# Patient Record
Sex: FEMALE | Race: WHITE | NOT HISPANIC OR LATINO
[De-identification: names, ages, dates, MRNs, and addresses within clinical notes are randomized per-mention and may not be internally consistent; named-entity substitution may affect disease eponyms.]

---

## 2017-04-25 ENCOUNTER — APPOINTMENT (OUTPATIENT)
Dept: CARDIOLOGY | Facility: CLINIC | Age: 77
End: 2017-04-25

## 2017-04-25 ENCOUNTER — NON-APPOINTMENT (OUTPATIENT)
Age: 77
End: 2017-04-25

## 2017-04-25 VITALS
WEIGHT: 158 LBS | BODY MASS INDEX: 28 KG/M2 | DIASTOLIC BLOOD PRESSURE: 83 MMHG | OXYGEN SATURATION: 98 % | HEART RATE: 70 BPM | HEIGHT: 63 IN | SYSTOLIC BLOOD PRESSURE: 166 MMHG

## 2017-04-25 VITALS — SYSTOLIC BLOOD PRESSURE: 160 MMHG | DIASTOLIC BLOOD PRESSURE: 90 MMHG

## 2017-04-25 DIAGNOSIS — Z87.891 PERSONAL HISTORY OF NICOTINE DEPENDENCE: ICD-10-CM

## 2017-04-25 DIAGNOSIS — R09.89 OTHER SPECIFIED SYMPTOMS AND SIGNS INVOLVING THE CIRCULATORY AND RESPIRATORY SYSTEMS: ICD-10-CM

## 2017-04-25 DIAGNOSIS — Z82.3 FAMILY HISTORY OF STROKE: ICD-10-CM

## 2017-04-25 DIAGNOSIS — R94.31 ABNORMAL ELECTROCARDIOGRAM [ECG] [EKG]: ICD-10-CM

## 2017-04-25 RX ORDER — MULTIVITAMIN
CAPSULE ORAL
Refills: 0 | Status: ACTIVE | COMMUNITY

## 2017-04-25 RX ORDER — CALCIUM CITRATE 200 MG (950 MG) TABLET 200(950)MG
TABLET ORAL DAILY
Refills: 0 | Status: ACTIVE | COMMUNITY

## 2017-05-09 ENCOUNTER — APPOINTMENT (OUTPATIENT)
Dept: CARDIOLOGY | Facility: CLINIC | Age: 77
End: 2017-05-09

## 2017-05-09 VITALS — DIASTOLIC BLOOD PRESSURE: 92 MMHG | SYSTOLIC BLOOD PRESSURE: 180 MMHG

## 2017-05-09 VITALS — OXYGEN SATURATION: 97 % | SYSTOLIC BLOOD PRESSURE: 180 MMHG | HEART RATE: 67 BPM | DIASTOLIC BLOOD PRESSURE: 90 MMHG

## 2017-05-23 ENCOUNTER — APPOINTMENT (OUTPATIENT)
Dept: CARDIOLOGY | Facility: CLINIC | Age: 77
End: 2017-05-23

## 2017-05-23 VITALS
HEART RATE: 61 BPM | DIASTOLIC BLOOD PRESSURE: 78 MMHG | WEIGHT: 158 LBS | HEIGHT: 63 IN | BODY MASS INDEX: 28 KG/M2 | OXYGEN SATURATION: 100 % | SYSTOLIC BLOOD PRESSURE: 160 MMHG

## 2017-05-23 VITALS — SYSTOLIC BLOOD PRESSURE: 160 MMHG | DIASTOLIC BLOOD PRESSURE: 80 MMHG

## 2017-06-01 PROBLEM — R09.89 OTHER SPECIFIED SYMPTOMS AND SIGNS INVOLVING THE CIRCULATORY AND RESPIRATORY SYSTEMS: Status: ACTIVE | Noted: 2017-06-01

## 2017-06-06 ENCOUNTER — APPOINTMENT (OUTPATIENT)
Dept: CARDIOLOGY | Facility: CLINIC | Age: 77
End: 2017-06-06

## 2017-06-06 VITALS
SYSTOLIC BLOOD PRESSURE: 166 MMHG | BODY MASS INDEX: 28 KG/M2 | HEIGHT: 63 IN | HEART RATE: 69 BPM | DIASTOLIC BLOOD PRESSURE: 76 MMHG | OXYGEN SATURATION: 96 % | WEIGHT: 158 LBS

## 2017-06-06 VITALS — DIASTOLIC BLOOD PRESSURE: 78 MMHG | RESPIRATION RATE: 20 BRPM | HEART RATE: 68 BPM | SYSTOLIC BLOOD PRESSURE: 140 MMHG

## 2017-06-08 ENCOUNTER — APPOINTMENT (OUTPATIENT)
Dept: CARDIOLOGY | Facility: CLINIC | Age: 77
End: 2017-06-08

## 2017-07-18 ENCOUNTER — MEDICATION RENEWAL (OUTPATIENT)
Age: 77
End: 2017-07-18

## 2017-08-02 ENCOUNTER — MEDICATION RENEWAL (OUTPATIENT)
Age: 77
End: 2017-08-02

## 2017-08-10 ENCOUNTER — APPOINTMENT (OUTPATIENT)
Dept: CARDIOLOGY | Facility: CLINIC | Age: 77
End: 2017-08-10
Payer: MEDICARE

## 2017-08-10 VITALS
DIASTOLIC BLOOD PRESSURE: 82 MMHG | OXYGEN SATURATION: 98 % | HEART RATE: 59 BPM | BODY MASS INDEX: 27.64 KG/M2 | WEIGHT: 156 LBS | SYSTOLIC BLOOD PRESSURE: 153 MMHG | HEIGHT: 63 IN

## 2017-08-10 PROCEDURE — 99214 OFFICE O/P EST MOD 30 MIN: CPT

## 2017-08-13 LAB
ALBUMIN SERPL ELPH-MCNC: 4.3 G/DL
ALP BLD-CCNC: 73 U/L
ALT SERPL-CCNC: 13 U/L
ANION GAP SERPL CALC-SCNC: 13 MMOL/L
AST SERPL-CCNC: 23 U/L
BASOPHILS # BLD AUTO: 0.02 K/UL
BASOPHILS NFR BLD AUTO: 0.3 %
BILIRUB SERPL-MCNC: 0.7 MG/DL
BUN SERPL-MCNC: 26 MG/DL
CALCIUM SERPL-MCNC: 9.4 MG/DL
CHLORIDE SERPL-SCNC: 103 MMOL/L
CHOLEST SERPL-MCNC: 152 MG/DL
CHOLEST/HDLC SERPL: 2.7 RATIO
CO2 SERPL-SCNC: 26 MMOL/L
CREAT SERPL-MCNC: 1.38 MG/DL
EOSINOPHIL # BLD AUTO: 0.31 K/UL
EOSINOPHIL NFR BLD AUTO: 5.3 %
GLUCOSE SERPL-MCNC: 97 MG/DL
HCT VFR BLD CALC: 37 %
HDLC SERPL-MCNC: 57 MG/DL
HGB BLD-MCNC: 12.3 G/DL
IMM GRANULOCYTES NFR BLD AUTO: 0 %
LDLC SERPL CALC-MCNC: 82 MG/DL
LYMPHOCYTES # BLD AUTO: 1.39 K/UL
LYMPHOCYTES NFR BLD AUTO: 24 %
MAN DIFF?: NORMAL
MCHC RBC-ENTMCNC: 29.8 PG
MCHC RBC-ENTMCNC: 33.2 GM/DL
MCV RBC AUTO: 89.6 FL
MONOCYTES # BLD AUTO: 0.52 K/UL
MONOCYTES NFR BLD AUTO: 9 %
NEUTROPHILS # BLD AUTO: 3.56 K/UL
NEUTROPHILS NFR BLD AUTO: 61.4 %
PLATELET # BLD AUTO: 279 K/UL
POTASSIUM SERPL-SCNC: 4.4 MMOL/L
PROT SERPL-MCNC: 8.4 G/DL
RBC # BLD: 4.13 M/UL
RBC # FLD: 12.7 %
SODIUM SERPL-SCNC: 142 MMOL/L
TRIGL SERPL-MCNC: 66 MG/DL
WBC # FLD AUTO: 5.8 K/UL

## 2017-10-10 ENCOUNTER — APPOINTMENT (OUTPATIENT)
Dept: CARDIOLOGY | Facility: CLINIC | Age: 77
End: 2017-10-10
Payer: MEDICARE

## 2017-10-10 PROCEDURE — 93015 CV STRESS TEST SUPVJ I&R: CPT

## 2017-11-09 ENCOUNTER — MEDICATION RENEWAL (OUTPATIENT)
Age: 77
End: 2017-11-09

## 2018-03-08 ENCOUNTER — APPOINTMENT (OUTPATIENT)
Dept: INTERNAL MEDICINE | Facility: CLINIC | Age: 78
End: 2018-03-08

## 2018-03-23 ENCOUNTER — APPOINTMENT (OUTPATIENT)
Dept: INTERNAL MEDICINE | Facility: CLINIC | Age: 78
End: 2018-03-23
Payer: MEDICARE

## 2018-03-23 VITALS
HEIGHT: 63 IN | SYSTOLIC BLOOD PRESSURE: 130 MMHG | HEART RATE: 85 BPM | BODY MASS INDEX: 27.29 KG/M2 | WEIGHT: 154 LBS | TEMPERATURE: 98 F | OXYGEN SATURATION: 96 % | RESPIRATION RATE: 14 BRPM | DIASTOLIC BLOOD PRESSURE: 70 MMHG

## 2018-03-23 DIAGNOSIS — Z23 ENCOUNTER FOR IMMUNIZATION: ICD-10-CM

## 2018-03-23 DIAGNOSIS — Z12.31 ENCOUNTER FOR SCREENING MAMMOGRAM FOR MALIGNANT NEOPLASM OF BREAST: ICD-10-CM

## 2018-03-23 PROCEDURE — G0009: CPT

## 2018-03-23 PROCEDURE — 90670 PCV13 VACCINE IM: CPT

## 2018-03-23 PROCEDURE — G0439: CPT

## 2018-03-24 LAB
25(OH)D3 SERPL-MCNC: 38.7 NG/ML
ALBUMIN SERPL ELPH-MCNC: 4.4 G/DL
ALP BLD-CCNC: 64 U/L
ALT SERPL-CCNC: 16 U/L
ANION GAP SERPL CALC-SCNC: 18 MMOL/L
AST SERPL-CCNC: 24 U/L
BASOPHILS # BLD AUTO: 0.02 K/UL
BASOPHILS NFR BLD AUTO: 0.3 %
BILIRUB SERPL-MCNC: 0.7 MG/DL
BUN SERPL-MCNC: 25 MG/DL
CALCIUM SERPL-MCNC: 10.5 MG/DL
CHLORIDE SERPL-SCNC: 102 MMOL/L
CHOLEST SERPL-MCNC: 142 MG/DL
CHOLEST/HDLC SERPL: 2.7 RATIO
CO2 SERPL-SCNC: 22 MMOL/L
CREAT SERPL-MCNC: 1.21 MG/DL
EOSINOPHIL # BLD AUTO: 0.28 K/UL
EOSINOPHIL NFR BLD AUTO: 4.8 %
FOLATE SERPL-MCNC: 15.7 NG/ML
GLUCOSE SERPL-MCNC: 90 MG/DL
HBA1C MFR BLD HPLC: 5.2 %
HCT VFR BLD CALC: 37.3 %
HDLC SERPL-MCNC: 53 MG/DL
HGB BLD-MCNC: 12.3 G/DL
IMM GRANULOCYTES NFR BLD AUTO: 0.2 %
LDLC SERPL CALC-MCNC: 76 MG/DL
LYMPHOCYTES # BLD AUTO: 1.36 K/UL
LYMPHOCYTES NFR BLD AUTO: 23.2 %
MAN DIFF?: NORMAL
MCHC RBC-ENTMCNC: 29.3 PG
MCHC RBC-ENTMCNC: 33 GM/DL
MCV RBC AUTO: 88.8 FL
MONOCYTES # BLD AUTO: 0.43 K/UL
MONOCYTES NFR BLD AUTO: 7.4 %
NEUTROPHILS # BLD AUTO: 3.75 K/UL
NEUTROPHILS NFR BLD AUTO: 64.1 %
PLATELET # BLD AUTO: 284 K/UL
POTASSIUM SERPL-SCNC: 4.7 MMOL/L
PROT SERPL-MCNC: 7.9 G/DL
RBC # BLD: 4.2 M/UL
RBC # FLD: 13.3 %
SODIUM SERPL-SCNC: 142 MMOL/L
TRIGL SERPL-MCNC: 67 MG/DL
TSH SERPL-ACNC: 1.44 UIU/ML
URATE SERPL-MCNC: 6.7 MG/DL
VIT B12 SERPL-MCNC: 460 PG/ML
WBC # FLD AUTO: 5.85 K/UL

## 2018-06-15 ENCOUNTER — MEDICATION RENEWAL (OUTPATIENT)
Age: 78
End: 2018-06-15

## 2018-06-29 ENCOUNTER — APPOINTMENT (OUTPATIENT)
Dept: INTERNAL MEDICINE | Facility: CLINIC | Age: 78
End: 2018-06-29
Payer: MEDICARE

## 2018-06-29 VITALS
TEMPERATURE: 98 F | RESPIRATION RATE: 14 BRPM | SYSTOLIC BLOOD PRESSURE: 132 MMHG | WEIGHT: 159 LBS | OXYGEN SATURATION: 97 % | BODY MASS INDEX: 28.17 KG/M2 | DIASTOLIC BLOOD PRESSURE: 78 MMHG | HEART RATE: 84 BPM | HEIGHT: 63 IN

## 2018-06-29 PROCEDURE — 99213 OFFICE O/P EST LOW 20 MIN: CPT

## 2018-06-29 NOTE — PHYSICAL EXAM
[No Acute Distress] : no acute distress [Well Nourished] : well nourished [Well Developed] : well developed [No Respiratory Distress] : no respiratory distress  [Clear to Auscultation] : lungs were clear to auscultation bilaterally [Normal Rate] : normal rate  [Regular Rhythm] : with a regular rhythm [Pedal Pulses Present] : the pedal pulses are present [No Edema] : there was no peripheral edema [Soft] : abdomen soft [Non Tender] : non-tender [Normal Affect] : the affect was normal [Normal Insight/Judgement] : insight and judgment were intact [de-identified] : points across lower back where her back pain is

## 2018-06-29 NOTE — HISTORY OF PRESENT ILLNESS
[FreeTextEntry1] : BP check [de-identified] : Pt is here for BP check.\par \par will ask for shingles shot in pharmacy. \par \par Has h/o CKD. \par \par She gained some weight and states she has not been eating healthy. She stress eats and stressed over her kids - daughter right now. Doesn't exercise. Has been to ortho for back pain in the past and has chronic pain which limits her activity.

## 2018-10-26 ENCOUNTER — APPOINTMENT (OUTPATIENT)
Dept: INTERNAL MEDICINE | Facility: CLINIC | Age: 78
End: 2018-10-26

## 2019-03-28 ENCOUNTER — NON-APPOINTMENT (OUTPATIENT)
Age: 79
End: 2019-03-28

## 2019-03-28 ENCOUNTER — APPOINTMENT (OUTPATIENT)
Dept: INTERNAL MEDICINE | Facility: CLINIC | Age: 79
End: 2019-03-28
Payer: MEDICARE

## 2019-03-28 VITALS
HEIGHT: 63 IN | DIASTOLIC BLOOD PRESSURE: 80 MMHG | SYSTOLIC BLOOD PRESSURE: 130 MMHG | HEART RATE: 86 BPM | TEMPERATURE: 98.3 F | BODY MASS INDEX: 29.06 KG/M2 | OXYGEN SATURATION: 98 % | WEIGHT: 164 LBS | RESPIRATION RATE: 14 BRPM

## 2019-03-28 PROCEDURE — 93000 ELECTROCARDIOGRAM COMPLETE: CPT

## 2019-03-28 PROCEDURE — G0439: CPT

## 2019-03-28 RX ORDER — CLINDAMYCIN HYDROCHLORIDE 300 MG/1
300 CAPSULE ORAL
Qty: 21 | Refills: 0 | Status: DISCONTINUED | COMMUNITY
Start: 2018-02-26 | End: 2019-03-28

## 2019-03-28 NOTE — HEALTH RISK ASSESSMENT
[No falls in past year] : Patient reported no falls in the past year [0] : 2) Feeling down, depressed, or hopeless: Not at all (0) [Patient reported mammogram was normal] : Patient reported mammogram was normal [] : No [MammogramDate] : 06/18

## 2019-03-28 NOTE — PHYSICAL EXAM
[No Acute Distress] : no acute distress [Well Nourished] : well nourished [Well Developed] : well developed [Well-Appearing] : well-appearing [Normal Sclera/Conjunctiva] : normal sclera/conjunctiva [PERRL] : pupils equal round and reactive to light [EOMI] : extraocular movements intact [Normal Outer Ear/Nose] : the outer ears and nose were normal in appearance [Normal Oropharynx] : the oropharynx was normal [Normal TMs] : both tympanic membranes were normal [Supple] : supple [No Lymphadenopathy] : no lymphadenopathy [Thyroid Normal, No Nodules] : the thyroid was normal and there were no nodules present [No Respiratory Distress] : no respiratory distress  [Clear to Auscultation] : lungs were clear to auscultation bilaterally [No Accessory Muscle Use] : no accessory muscle use [Normal Rate] : normal rate  [Regular Rhythm] : with a regular rhythm [Normal S1, S2] : normal S1 and S2 [No Murmur] : no murmur heard [No Carotid Bruits] : no carotid bruits [Pedal Pulses Present] : the pedal pulses are present [No Edema] : there was no peripheral edema [Normal Appearance] : normal in appearance [No Masses] : no palpable masses [Soft] : abdomen soft [Non Tender] : non-tender [No CVA Tenderness] : no CVA  tenderness [No Joint Swelling] : no joint swelling [Grossly Normal Strength/Tone] : grossly normal strength/tone [No Rash] : no rash [Normal Gait] : normal gait [Coordination Grossly Intact] : coordination grossly intact [No Focal Deficits] : no focal deficits [Deep Tendon Reflexes (DTR)] : deep tendon reflexes were 2+ and symmetric [Normal Affect] : the affect was normal [Normal Insight/Judgement] : insight and judgment were intact [de-identified] : lower back tenderness

## 2019-03-28 NOTE — HISTORY OF PRESENT ILLNESS
[de-identified] : Pt is here for cpe. \par She has chronic back pain for years. It has gotten worse. Had on right side for many years and then went to left side. Goes away right away when she rests. She did see Dr Toro last week and had x-rays done. \par Last bone density was several years ago and refuses to take medication for it so doesn't want to be rechecked.

## 2019-03-29 LAB
25(OH)D3 SERPL-MCNC: 32.7 NG/ML
ALBUMIN SERPL ELPH-MCNC: 4.6 G/DL
ALP BLD-CCNC: 81 U/L
ALT SERPL-CCNC: 14 U/L
ANION GAP SERPL CALC-SCNC: 12 MMOL/L
AST SERPL-CCNC: 22 U/L
BASOPHILS # BLD AUTO: 0.05 K/UL
BASOPHILS NFR BLD AUTO: 0.9 %
BILIRUB SERPL-MCNC: 0.8 MG/DL
BUN SERPL-MCNC: 28 MG/DL
CALCIUM SERPL-MCNC: 10.3 MG/DL
CHLORIDE SERPL-SCNC: 107 MMOL/L
CHOLEST SERPL-MCNC: 134 MG/DL
CHOLEST/HDLC SERPL: 2.7 RATIO
CO2 SERPL-SCNC: 24 MMOL/L
CREAT SERPL-MCNC: 1.16 MG/DL
EOSINOPHIL # BLD AUTO: 0.26 K/UL
EOSINOPHIL NFR BLD AUTO: 4.5 %
ESTIMATED AVERAGE GLUCOSE: 100 MG/DL
FOLATE SERPL-MCNC: 17.4 NG/ML
GLUCOSE SERPL-MCNC: 98 MG/DL
HBA1C MFR BLD HPLC: 5.1 %
HCT VFR BLD CALC: 39.2 %
HDLC SERPL-MCNC: 49 MG/DL
HGB BLD-MCNC: 12.5 G/DL
IMM GRANULOCYTES NFR BLD AUTO: 0.2 %
LDLC SERPL CALC-MCNC: 71 MG/DL
LYMPHOCYTES # BLD AUTO: 1.32 K/UL
LYMPHOCYTES NFR BLD AUTO: 22.6 %
MAN DIFF?: NORMAL
MCHC RBC-ENTMCNC: 28.5 PG
MCHC RBC-ENTMCNC: 31.9 GM/DL
MCV RBC AUTO: 89.5 FL
MONOCYTES # BLD AUTO: 0.48 K/UL
MONOCYTES NFR BLD AUTO: 8.2 %
NEUTROPHILS # BLD AUTO: 3.72 K/UL
NEUTROPHILS NFR BLD AUTO: 63.6 %
PLATELET # BLD AUTO: 285 K/UL
POTASSIUM SERPL-SCNC: 5.1 MMOL/L
PROT SERPL-MCNC: 7.6 G/DL
RBC # BLD: 4.38 M/UL
RBC # FLD: 12.8 %
SODIUM SERPL-SCNC: 143 MMOL/L
TRIGL SERPL-MCNC: 68 MG/DL
TSH SERPL-ACNC: 1.78 UIU/ML
URATE SERPL-MCNC: 7.4 MG/DL
VIT B12 SERPL-MCNC: 637 PG/ML
WBC # FLD AUTO: 5.84 K/UL

## 2019-06-11 ENCOUNTER — NON-APPOINTMENT (OUTPATIENT)
Age: 79
End: 2019-06-11

## 2019-06-11 ENCOUNTER — APPOINTMENT (OUTPATIENT)
Dept: CARDIOLOGY | Facility: CLINIC | Age: 79
End: 2019-06-11
Payer: MEDICARE

## 2019-06-11 VITALS
BODY MASS INDEX: 27.82 KG/M2 | HEART RATE: 62 BPM | DIASTOLIC BLOOD PRESSURE: 84 MMHG | WEIGHT: 157 LBS | SYSTOLIC BLOOD PRESSURE: 146 MMHG | OXYGEN SATURATION: 98 % | HEIGHT: 63 IN

## 2019-06-11 PROCEDURE — 93000 ELECTROCARDIOGRAM COMPLETE: CPT

## 2019-06-11 PROCEDURE — 99214 OFFICE O/P EST MOD 30 MIN: CPT

## 2019-06-11 NOTE — DISCUSSION/SUMMARY
[FreeTextEntry1] : The patient is doing well without any signs or symptoms of active heart disease. On her medication her blood pressure and cholesterol are well controlled and she had a normal stress test 2017. I suspect that those biphasic T waves on your ECG were artifact. Today's ECG looks identical to the one from 2017.\par \par Unless the patient has any symptoms I would not do any further testing at this time. She'll stay on her present medications. If she does have any problems or symptoms she will call me. Otherwise I will see her in one year.

## 2019-06-11 NOTE — PHYSICAL EXAM
[General Appearance - Well Developed] : well developed [Normal Appearance] : normal appearance [No Deformities] : no deformities [Well Groomed] : well groomed [General Appearance - Well Nourished] : well nourished [Normal Conjunctiva] : the conjunctiva exhibited no abnormalities [General Appearance - In No Acute Distress] : no acute distress [Normal Jugular Venous A Waves Present] : normal jugular venous A waves present [Normal Oral Mucosa] : normal oral mucosa [Normal Jugular Venous V Waves Present] : normal jugular venous V waves present [No Jugular Venous Isabel A Waves] : no jugular venous isabel A waves [Respiration, Rhythm And Depth] : normal respiratory rhythm and effort [Auscultation Breath Sounds / Voice Sounds] : lungs were clear to auscultation bilaterally [Exaggerated Use Of Accessory Muscles For Inspiration] : no accessory muscle use [Bowel Sounds] : normal bowel sounds [Gait - Sufficient For Exercise Testing] : the gait was sufficient for exercise testing [Abnormal Walk] : normal gait [Abdomen Tenderness] : non-tender [Abdomen Soft] : soft [Cyanosis, Localized] : no localized cyanosis [Skin Color & Pigmentation] : normal skin color and pigmentation [Nail Clubbing] : no clubbing of the fingernails [Skin Turgor] : normal skin turgor [] : no rash [Impaired Insight] : insight and judgment were intact [Oriented To Time, Place, And Person] : oriented to person, place, and time [No Anxiety] : not feeling anxious [Not Palpable] : not palpable [Normal Rate] : normal [Normal S2] : normal S2 [Normal S1] : normal S1 [II] : a grade 2 [2+] : left 2+ [No Abnormalities] : the abdominal aorta was not enlarged and no bruit was heard [1+] : left 1+ [No Pitting Edema] : no pitting edema present [Rt] : varicose veins of the right leg noted [Lt] : varicose veins of the left leg noted [S3] : no S3 [S4] : no S4 [Left Carotid Bruit] : no bruit heard over the left carotid [Right Carotid Bruit] : no bruit heard over the right carotid [Left Femoral Bruit] : no bruit heard over the left femoral artery [Right Femoral Bruit] : no bruit heard over the right femoral artery

## 2019-06-11 NOTE — HISTORY OF PRESENT ILLNESS
[FreeTextEntry1] : I saw Katherine Zhang in the office today for cardiac follow up. She is a 78-year-old white female who had been seeing Dr. Vora who retired. She was being treated for hypertension. She does have borderline high cholesterol. She denies any diabetes, or family history of premature heart disease. She smoked very occasionally but stopped in 1969.\par \par She does not exercise but with the activity she does she has no chest pains or shortness of breath. She has nonspecific EKG which has been stable since 2005.\par \par She saw Dr. [place] in 2014 and had a workup which he thinks included carotid Doppler, echo, and Holter monitor all of which were negative. A copy of her arterial Doppler was done in 2014 showed mild disease. Also a carotid Doppler 2014 that showed mild plaque. Echocardiogram showed an ejection fraction of 60% was stage I diastolic dysfunction. There was no valvular heart disease.She had a stress test 10/17 that showed no ischemia a total workload of 6 mets.\par \par Carotid Doppler performed 6/17 showed mild plaque. She is now on Crestor 5 mg once a day and tolerating it well. Does get her blood pressure checked every week and it runs about 130/80.\par \par Routine ECG from your office performed 3/19 demonstrated sinus rhythm with one APC. There appeared to be  biphasic T waves in leads V5-V6. Blood work from March 2019 demonstrates a total cholesterol 134, triglycerides 68, HDL 49, LDL 71. Normal CBC, CMP, and A1c.\par \par Repeat ECG shows sinus rhythm with nonspecific T-wave flattening. There is no change compared to 2017.\par \par \par

## 2019-06-11 NOTE — REVIEW OF SYSTEMS
[Tinnitus] : tinnitus [Eyeglasses] : currently wearing eyeglasses [Negative] : Genitourinary [Fever] : no fever [Headache] : no headache [Chills] : no chills [Feeling Fatigued] : not feeling fatigued [Seeing Double (Diplopia)] : no diplopia [Blurry Vision] : no blurred vision [Skin: A Rash] : no rash: [Joint Pain] : no joint pain [Anxiety] : no anxiety [Depression] : no depression [Dizziness] : no dizziness [Easy Bleeding] : no tendency for easy bleeding [Excessive Thirst] : no polydipsia [Easy Bruising] : no tendency for easy bruising

## 2020-04-02 ENCOUNTER — APPOINTMENT (OUTPATIENT)
Dept: INTERNAL MEDICINE | Facility: CLINIC | Age: 80
End: 2020-04-02

## 2020-05-18 ENCOUNTER — APPOINTMENT (OUTPATIENT)
Dept: INTERNAL MEDICINE | Facility: CLINIC | Age: 80
End: 2020-05-18
Payer: MEDICARE

## 2020-05-18 PROCEDURE — 99442: CPT | Mod: 95

## 2020-05-18 NOTE — HISTORY OF PRESENT ILLNESS
[FreeTextEntry1] : follow up on meds [Verbal consent obtained from patient] : the patient, [unfilled] [de-identified] : Pt needs a refill of rosuvastatin and losartan. Doing well. \par \par On 4/5/2020 she fell onto pavement. She had a bruised face. She did not lose consciousness. She states her shoe got stuck and she went down. She is much better now. \par \par She has been feeling well with no cough, fever, sob. \par \par Needs mammogram referral.

## 2020-06-09 LAB
25(OH)D3 SERPL-MCNC: 40.1 NG/ML
ALBUMIN SERPL ELPH-MCNC: 4.7 G/DL
ALP BLD-CCNC: 75 U/L
ALT SERPL-CCNC: 17 U/L
ANION GAP SERPL CALC-SCNC: 15 MMOL/L
AST SERPL-CCNC: 25 U/L
BASOPHILS # BLD AUTO: 0.04 K/UL
BASOPHILS NFR BLD AUTO: 0.6 %
BILIRUB SERPL-MCNC: 0.6 MG/DL
BUN SERPL-MCNC: 34 MG/DL
CALCIUM SERPL-MCNC: 10.2 MG/DL
CHLORIDE SERPL-SCNC: 101 MMOL/L
CHOLEST SERPL-MCNC: 150 MG/DL
CHOLEST/HDLC SERPL: 3.1 RATIO
CO2 SERPL-SCNC: 25 MMOL/L
CREAT SERPL-MCNC: 1.38 MG/DL
EOSINOPHIL # BLD AUTO: 0.32 K/UL
EOSINOPHIL NFR BLD AUTO: 4.8 %
ESTIMATED AVERAGE GLUCOSE: 105 MG/DL
FOLATE SERPL-MCNC: 10.2 NG/ML
GLUCOSE SERPL-MCNC: 102 MG/DL
HBA1C MFR BLD HPLC: 5.3 %
HCT VFR BLD CALC: 41 %
HDLC SERPL-MCNC: 49 MG/DL
HGB BLD-MCNC: 13.1 G/DL
IMM GRANULOCYTES NFR BLD AUTO: 0.2 %
LDLC SERPL CALC-MCNC: 85 MG/DL
LYMPHOCYTES # BLD AUTO: 1.94 K/UL
LYMPHOCYTES NFR BLD AUTO: 29.2 %
MAN DIFF?: NORMAL
MCHC RBC-ENTMCNC: 29 PG
MCHC RBC-ENTMCNC: 32 GM/DL
MCV RBC AUTO: 90.9 FL
MONOCYTES # BLD AUTO: 0.52 K/UL
MONOCYTES NFR BLD AUTO: 7.8 %
NEUTROPHILS # BLD AUTO: 3.82 K/UL
NEUTROPHILS NFR BLD AUTO: 57.4 %
PLATELET # BLD AUTO: 292 K/UL
POTASSIUM SERPL-SCNC: 4.7 MMOL/L
PROT SERPL-MCNC: 7.4 G/DL
RBC # BLD: 4.51 M/UL
RBC # FLD: 13.6 %
SODIUM SERPL-SCNC: 142 MMOL/L
TRIGL SERPL-MCNC: 82 MG/DL
TSH SERPL-ACNC: 2.59 UIU/ML
URATE SERPL-MCNC: 8.6 MG/DL
VIT B12 SERPL-MCNC: 588 PG/ML
WBC # FLD AUTO: 6.65 K/UL

## 2020-07-28 ENCOUNTER — NON-APPOINTMENT (OUTPATIENT)
Age: 80
End: 2020-07-28

## 2020-07-28 ENCOUNTER — APPOINTMENT (OUTPATIENT)
Dept: CARDIOLOGY | Facility: CLINIC | Age: 80
End: 2020-07-28
Payer: MEDICARE

## 2020-07-28 VITALS
OXYGEN SATURATION: 96 % | SYSTOLIC BLOOD PRESSURE: 138 MMHG | WEIGHT: 146 LBS | DIASTOLIC BLOOD PRESSURE: 77 MMHG | HEIGHT: 63 IN | BODY MASS INDEX: 25.87 KG/M2 | HEART RATE: 65 BPM

## 2020-07-28 PROCEDURE — 93000 ELECTROCARDIOGRAM COMPLETE: CPT

## 2020-07-28 PROCEDURE — 99214 OFFICE O/P EST MOD 30 MIN: CPT

## 2020-07-28 NOTE — DISCUSSION/SUMMARY
[FreeTextEntry1] : The patient is doing well without any signs or symptoms of active heart disease. On her medication her blood pressure is controlled. Cholesterol; up a little bit. She will schedule a carotid Doppler. Doppler stable I will leave her medicines unchanged. If the carotid Doppler shows any progression of plaque I might increase her Crestor.\par \par We did go over her blood work, and her medications, and I answered her questions.

## 2020-07-28 NOTE — PHYSICAL EXAM
[General Appearance - Well Developed] : well developed [Normal Appearance] : normal appearance [No Deformities] : no deformities [General Appearance - Well Nourished] : well nourished [Well Groomed] : well groomed [Normal Conjunctiva] : the conjunctiva exhibited no abnormalities [Normal Oral Mucosa] : normal oral mucosa [General Appearance - In No Acute Distress] : no acute distress [Normal Jugular Venous V Waves Present] : normal jugular venous V waves present [Normal Jugular Venous A Waves Present] : normal jugular venous A waves present [No Jugular Venous Isabel A Waves] : no jugular venous isabel A waves [Auscultation Breath Sounds / Voice Sounds] : lungs were clear to auscultation bilaterally [Exaggerated Use Of Accessory Muscles For Inspiration] : no accessory muscle use [Respiration, Rhythm And Depth] : normal respiratory rhythm and effort [Abdomen Soft] : soft [Abdomen Tenderness] : non-tender [Bowel Sounds] : normal bowel sounds [Nail Clubbing] : no clubbing of the fingernails [Gait - Sufficient For Exercise Testing] : the gait was sufficient for exercise testing [Abnormal Walk] : normal gait [Skin Color & Pigmentation] : normal skin color and pigmentation [Cyanosis, Localized] : no localized cyanosis [Skin Turgor] : normal skin turgor [] : no rash [Impaired Insight] : insight and judgment were intact [Oriented To Time, Place, And Person] : oriented to person, place, and time [Normal Rate] : normal [No Anxiety] : not feeling anxious [Not Palpable] : not palpable [Normal S2] : normal S2 [Normal S1] : normal S1 [II] : a grade 2 [2+] : left 2+ [1+] : left 1+ [No Pitting Edema] : no pitting edema present [No Abnormalities] : the abdominal aorta was not enlarged and no bruit was heard [Rt] : varicose veins of the right leg noted [Lt] : varicose veins of the left leg noted [S3] : no S3 [S4] : no S4 [Left Carotid Bruit] : no bruit heard over the left carotid [Left Femoral Bruit] : no bruit heard over the left femoral artery [Right Carotid Bruit] : no bruit heard over the right carotid [Right Femoral Bruit] : no bruit heard over the right femoral artery

## 2020-07-28 NOTE — HISTORY OF PRESENT ILLNESS
[FreeTextEntry1] : I saw Katherine Zhang in the office today for cardiac follow up. She is an 79-year-old white female who had been seeing Dr. Vora who retired. She was being treated for hypertension. She does have borderline high cholesterol. She denies any diabetes, or family history of premature heart disease. She smoked very occasionally but stopped in 1969.\par \par She does not exercise but with the activity she does she has no chest pains or shortness of breath. She has nonspecific EKG which has been stable since 2005.\par \par She saw Dr. [place] in 2014 and had a workup which he thinks included carotid Doppler, echo, and Holter monitor all of which were negative. A copy of her arterial Doppler was done in 2014 showed mild disease. Also a carotid Doppler 2014 that showed mild plaque. Echocardiogram showed an ejection fraction of 60% was stage I diastolic dysfunction. There was no valvular heart disease.She had a stress test 10/17 that showed no ischemia a total workload of 6 mets.\par \par Carotid Doppler performed 6/17 showed mild plaque. She is now on Crestor 5 mg once a day and tolerating it well. Does get her blood pressure checked every week and it runs about 130/80.\par \par Routine ECG from your office performed 3/19 demonstrated sinus rhythm with one APC. There appeared to be  biphasic T waves in leads V5-V6. Blood work 6/20 demonstrates a creatinine 1.38 with a BUN of 34. Cholesterol 150, triglycerides 82, HDL 49, LDL 85.\par \par The patient has been doing well. She is active and has lost about 10 pounds\par \par Repeat ECG shows sinus rhythm with nonspecific T-wave flattening. There is no change compared to 2019.\par \par \par

## 2020-09-08 ENCOUNTER — APPOINTMENT (OUTPATIENT)
Dept: CARDIOLOGY | Facility: CLINIC | Age: 80
End: 2020-09-08
Payer: MEDICARE

## 2020-09-08 PROCEDURE — 93880 EXTRACRANIAL BILAT STUDY: CPT

## 2021-06-24 ENCOUNTER — APPOINTMENT (OUTPATIENT)
Dept: INTERNAL MEDICINE | Facility: CLINIC | Age: 81
End: 2021-06-24
Payer: MEDICARE

## 2021-06-24 VITALS
DIASTOLIC BLOOD PRESSURE: 72 MMHG | TEMPERATURE: 97.3 F | WEIGHT: 154 LBS | RESPIRATION RATE: 14 BRPM | SYSTOLIC BLOOD PRESSURE: 130 MMHG | BODY MASS INDEX: 27.29 KG/M2 | OXYGEN SATURATION: 97 % | HEART RATE: 55 BPM | HEIGHT: 63 IN

## 2021-06-24 DIAGNOSIS — I65.29 OCCLUSION AND STENOSIS OF UNSPECIFIED CAROTID ARTERY: ICD-10-CM

## 2021-06-24 PROCEDURE — G0439: CPT

## 2021-06-24 NOTE — HEALTH RISK ASSESSMENT
[Monthly or less (1 pt)] : Monthly or less (1 point) [1 or 2 (0 pts)] : 1 or 2 (0 points) [No] : In the past 12 months have you used drugs other than those required for medical reasons? No [No falls in past year] : Patient reported no falls in the past year [0] : 2) Feeling down, depressed, or hopeless: Not at all (0) [Patient reported mammogram was normal] : Patient reported mammogram was normal [None] : None [Fully functional (bathing, dressing, toileting, transferring, walking, feeding)] : Fully functional (bathing, dressing, toileting, transferring, walking, feeding) [Fully functional (using the telephone, shopping, preparing meals, housekeeping, doing laundry, using] : Fully functional and needs no help or supervision to perform IADLs (using the telephone, shopping, preparing meals, housekeeping, doing laundry, using transportation, managing medications and managing finances) [] : No [Change in mental status noted] : No change in mental status noted [Language] : denies difficulty with language [Reports changes in hearing] : Reports no changes in hearing [Reports changes in vision] : Reports no changes in vision [Reports changes in dental health] : Reports no changes in dental health [MammogramDate] : 07/20

## 2021-06-24 NOTE — HISTORY OF PRESENT ILLNESS
[de-identified] : Continues to have mid/lower back pain. Has been to ortho, had P.T. Seems to worsening. Went to [place] in 2019 so she went for P.T. prior because she would be walking a lot. \par Not on meds for osteoporosis and doesn't want to start. \par Will go for shingrix.

## 2021-06-24 NOTE — PHYSICAL EXAM
[No Acute Distress] : no acute distress [Well Nourished] : well nourished [Well Developed] : well developed [Well-Appearing] : well-appearing [Normal Sclera/Conjunctiva] : normal sclera/conjunctiva [PERRL] : pupils equal round and reactive to light [EOMI] : extraocular movements intact [Normal Outer Ear/Nose] : the outer ears and nose were normal in appearance [Normal Oropharynx] : the oropharynx was normal [No JVD] : no jugular venous distention [No Lymphadenopathy] : no lymphadenopathy [Supple] : supple [Thyroid Normal, No Nodules] : the thyroid was normal and there were no nodules present [No Respiratory Distress] : no respiratory distress  [No Accessory Muscle Use] : no accessory muscle use [Clear to Auscultation] : lungs were clear to auscultation bilaterally [Normal Rate] : normal rate  [Regular Rhythm] : with a regular rhythm [Normal S1, S2] : normal S1 and S2 [No Murmur] : no murmur heard [No Carotid Bruits] : no carotid bruits [No Abdominal Bruit] : a ~M bruit was not heard ~T in the abdomen [No Varicosities] : no varicosities [Pedal Pulses Present] : the pedal pulses are present [No Edema] : there was no peripheral edema [No Palpable Aorta] : no palpable aorta [No Extremity Clubbing/Cyanosis] : no extremity clubbing/cyanosis [Normal Appearance] : normal in appearance [Soft] : abdomen soft [Non Tender] : non-tender [Non-distended] : non-distended [No HSM] : no HSM [No Masses] : no abdominal mass palpated [Normal Bowel Sounds] : normal bowel sounds [Normal Posterior Cervical Nodes] : no posterior cervical lymphadenopathy [Normal Anterior Cervical Nodes] : no anterior cervical lymphadenopathy [No CVA Tenderness] : no CVA  tenderness [No Spinal Tenderness] : no spinal tenderness [No Joint Swelling] : no joint swelling [Grossly Normal Strength/Tone] : grossly normal strength/tone [No Rash] : no rash [Coordination Grossly Intact] : coordination grossly intact [No Focal Deficits] : no focal deficits [Normal Gait] : normal gait [Deep Tendon Reflexes (DTR)] : deep tendon reflexes were 2+ and symmetric [Normal Affect] : the affect was normal [Normal Insight/Judgement] : insight and judgment were intact [de-identified] : points across lumbar area where pain is

## 2021-06-25 LAB
25(OH)D3 SERPL-MCNC: 39.6 NG/ML
ALBUMIN SERPL ELPH-MCNC: 4.7 G/DL
ALP BLD-CCNC: 89 U/L
ALT SERPL-CCNC: 14 U/L
ANION GAP SERPL CALC-SCNC: 14 MMOL/L
AST SERPL-CCNC: 21 U/L
BASOPHILS # BLD AUTO: 0.02 K/UL
BASOPHILS NFR BLD AUTO: 0.3 %
BILIRUB SERPL-MCNC: 1 MG/DL
BUN SERPL-MCNC: 30 MG/DL
CALCIUM SERPL-MCNC: 10.1 MG/DL
CHLORIDE SERPL-SCNC: 102 MMOL/L
CHOLEST SERPL-MCNC: 148 MG/DL
CO2 SERPL-SCNC: 24 MMOL/L
CREAT SERPL-MCNC: 1.17 MG/DL
EOSINOPHIL # BLD AUTO: 0.4 K/UL
EOSINOPHIL NFR BLD AUTO: 6.4 %
ESTIMATED AVERAGE GLUCOSE: 103 MG/DL
FOLATE SERPL-MCNC: 15.3 NG/ML
GLUCOSE SERPL-MCNC: 98 MG/DL
HBA1C MFR BLD HPLC: 5.2 %
HCT VFR BLD CALC: 41.5 %
HDLC SERPL-MCNC: 51 MG/DL
HGB BLD-MCNC: 13.2 G/DL
IMM GRANULOCYTES NFR BLD AUTO: 0.2 %
LDLC SERPL CALC-MCNC: 78 MG/DL
LYMPHOCYTES # BLD AUTO: 1.42 K/UL
LYMPHOCYTES NFR BLD AUTO: 22.6 %
MAN DIFF?: NORMAL
MCHC RBC-ENTMCNC: 30.1 PG
MCHC RBC-ENTMCNC: 31.8 GM/DL
MCV RBC AUTO: 94.5 FL
MONOCYTES # BLD AUTO: 0.53 K/UL
MONOCYTES NFR BLD AUTO: 8.4 %
NEUTROPHILS # BLD AUTO: 3.9 K/UL
NEUTROPHILS NFR BLD AUTO: 62.1 %
NONHDLC SERPL-MCNC: 97 MG/DL
PLATELET # BLD AUTO: 288 K/UL
POTASSIUM SERPL-SCNC: 4.3 MMOL/L
PROT SERPL-MCNC: 7.8 G/DL
RBC # BLD: 4.39 M/UL
RBC # FLD: 12.3 %
SODIUM SERPL-SCNC: 140 MMOL/L
TRIGL SERPL-MCNC: 98 MG/DL
TSH SERPL-ACNC: 2.2 UIU/ML
URATE SERPL-MCNC: 6.9 MG/DL
VIT B12 SERPL-MCNC: 558 PG/ML
WBC # FLD AUTO: 6.28 K/UL

## 2021-07-29 ENCOUNTER — APPOINTMENT (OUTPATIENT)
Dept: CARDIOLOGY | Facility: CLINIC | Age: 81
End: 2021-07-29
Payer: MEDICARE

## 2021-07-29 ENCOUNTER — NON-APPOINTMENT (OUTPATIENT)
Age: 81
End: 2021-07-29

## 2021-07-29 VITALS
HEIGHT: 63 IN | BODY MASS INDEX: 28.17 KG/M2 | WEIGHT: 159 LBS | HEART RATE: 71 BPM | SYSTOLIC BLOOD PRESSURE: 161 MMHG | DIASTOLIC BLOOD PRESSURE: 81 MMHG | OXYGEN SATURATION: 99 %

## 2021-07-29 PROCEDURE — 99214 OFFICE O/P EST MOD 30 MIN: CPT

## 2021-07-29 PROCEDURE — 93000 ELECTROCARDIOGRAM COMPLETE: CPT

## 2021-07-29 NOTE — REVIEW OF SYSTEMS
[Tinnitus] : tinnitus [Negative] : Genitourinary [Joint Pain] : joint pain [Lower Back Pain] : lower back pain [Rash] : no rash [Dizziness] : no dizziness [Depression] : no depression [Anxiety] : no anxiety [Easy Bleeding] : no tendency for easy bleeding [Easy Bruising] : no tendency for easy bruising

## 2021-07-29 NOTE — HISTORY OF PRESENT ILLNESS
[FreeTextEntry1] : I saw Katherine Zhang in the office today for cardiac follow up. She is an 80-year-old white female who had been seeing Dr. Vora who retired. She was being treated for hypertension. She does have borderline high cholesterol. She denies any diabetes, or family history of premature heart disease. She smoked very occasionally but stopped in 1969.\par \par She does not exercise but with the activity she has no chest pains or shortness of breath. She has nonspecific EKG which has been stable since 2005.\par \par She saw Dr. [place] in 2014 and had a workup which she thinks included carotid Doppler, echo, and Holter monitor all of which were negative. A copy of her arterial Doppler was done in 2014 showed mild disease. Also a carotid Doppler 9/20 showed mild plaque, no change compared to 2017.. Echocardiogram 2014 showed an ejection fraction of 60% was stage I diastolic dysfunction. There was no valvular heart disease.She had a stress test 10/17 that showed no ischemia a total workload of 6 mets.\par \par  She is now on Crestor 5 mg once a day and tolerating it well. Does get her blood pressure checked every week and it runs about 130/80.\par \par Routine ECG from your office performed 3/19 demonstrated sinus rhythm with one APC. There appeared to be  biphasic T waves in leads V5-V6.  Blood work 6/21 demonstrates a cholesterol 148, triglycerides 98, HDL 51, and LDL 78.  A1c was 5.2..\par \par The patient has been doing well.  Is very sedentary because of chronic back pain.  She has gained back the weight that she has lost.\par \par Repeat ECG shows sinus rhythm,.  There is no change compared to 2020.\par \par \par

## 2021-07-29 NOTE — DISCUSSION/SUMMARY
[FreeTextEntry1] : Clinically the patient is doing well.  Although she is sedentary with her activity she has no chest pain, shortness of breath, palpitation.  Blood pressure has been well controlled and her cholesterol is good.  Carotid Doppler is stable.\par \par She will schedule an echocardiogram.  Will stay on her present medication.  We did go over her medication, her blood work, carotid Doppler, and I answered all of her questions.  Did discuss her back pain and it might be worthwhile for her to go to pain management since this is a very limiting factor.  She needs to be careful with her weight.\par \par If all is well I would see her again in 1 year.\par \par

## 2021-07-29 NOTE — PHYSICAL EXAM
[General Appearance - Well Developed] : well developed [Normal Appearance] : normal appearance [Well Groomed] : well groomed [General Appearance - Well Nourished] : well nourished [No Deformities] : no deformities [General Appearance - In No Acute Distress] : no acute distress [Normal Conjunctiva] : the conjunctiva exhibited no abnormalities [Normal Oral Mucosa] : normal oral mucosa [Normal Jugular Venous A Waves Present] : normal jugular venous A waves present [Normal Jugular Venous V Waves Present] : normal jugular venous V waves present [No Jugular Venous Isabel A Waves] : no jugular venous isabel A waves [Respiration, Rhythm And Depth] : normal respiratory rhythm and effort [Auscultation Breath Sounds / Voice Sounds] : lungs were clear to auscultation bilaterally [Exaggerated Use Of Accessory Muscles For Inspiration] : no accessory muscle use [Bowel Sounds] : normal bowel sounds [Abdomen Soft] : soft [Abdomen Tenderness] : non-tender [Abnormal Walk] : normal gait [Gait - Sufficient For Exercise Testing] : the gait was sufficient for exercise testing [Nail Clubbing] : no clubbing of the fingernails [Cyanosis, Localized] : no localized cyanosis [Skin Turgor] : normal skin turgor [Skin Color & Pigmentation] : normal skin color and pigmentation [] : no rash [Oriented To Time, Place, And Person] : oriented to person, place, and time [Impaired Insight] : insight and judgment were intact [No Anxiety] : not feeling anxious [Not Palpable] : not palpable [Normal Rate] : normal [Normal S1] : normal S1 [Normal S2] : normal S2 [II] : a grade 2 [2+] : left 2+ [1+] : left 1+ [No Abnormalities] : the abdominal aorta was not enlarged and no bruit was heard [No Pitting Edema] : no pitting edema present [Rt] : varicose veins of the right leg noted [Lt] : varicose veins of the left leg noted [S3] : no S3 [S4] : no S4 [Left Carotid Bruit] : no bruit heard over the left carotid [Right Carotid Bruit] : no bruit heard over the right carotid [Right Femoral Bruit] : no bruit heard over the right femoral artery [Left Femoral Bruit] : no bruit heard over the left femoral artery

## 2021-08-05 ENCOUNTER — APPOINTMENT (OUTPATIENT)
Dept: CARDIOLOGY | Facility: CLINIC | Age: 81
End: 2021-08-05
Payer: MEDICARE

## 2021-08-05 PROCEDURE — 93306 TTE W/DOPPLER COMPLETE: CPT

## 2022-06-27 ENCOUNTER — NON-APPOINTMENT (OUTPATIENT)
Age: 82
End: 2022-06-27

## 2022-06-27 ENCOUNTER — APPOINTMENT (OUTPATIENT)
Dept: INTERNAL MEDICINE | Facility: CLINIC | Age: 82
End: 2022-06-27

## 2022-06-27 VITALS
SYSTOLIC BLOOD PRESSURE: 122 MMHG | HEART RATE: 64 BPM | WEIGHT: 143 LBS | TEMPERATURE: 97 F | BODY MASS INDEX: 25.34 KG/M2 | HEIGHT: 63 IN | RESPIRATION RATE: 15 BRPM | DIASTOLIC BLOOD PRESSURE: 80 MMHG

## 2022-06-27 DIAGNOSIS — I73.9 PERIPHERAL VASCULAR DISEASE, UNSPECIFIED: Chronic | ICD-10-CM

## 2022-06-27 PROCEDURE — G0439: CPT

## 2022-06-27 PROCEDURE — 93000 ELECTROCARDIOGRAM COMPLETE: CPT

## 2022-06-27 NOTE — HISTORY OF PRESENT ILLNESS
[No Pertinent Cardiac History] : no history of aortic stenosis, atrial fibrillation, coronary artery disease, recent myocardial infarction, or implantable device/pacemaker [No Pertinent Pulmonary History] : no history of asthma, COPD, sleep apnea, or smoking [No Adverse Anesthesia Reaction] : no adverse anesthesia reaction in self or family member [Chronic Anticoagulation] : no chronic anticoagulation [Diabetes] : no diabetes [FreeTextEntry1] : cataract [FreeTextEntry2] : 7/14/22 [FreeTextEntry3] : Dr Manning [FreeTextEntry4] : Pt is here for cpe and preop evaluation prior to cataract surgery. Also has h/o glaucoma. \par Has htn, hyperlipidemia.\par c/o chronic back pain.

## 2022-06-27 NOTE — ASSESSMENT
[Patient Optimized for Surgery] : Patient optimized for surgery [FreeTextEntry4] : hyperlipidemia\par on rosuvastatin\par \par HTN\par well controlled\par \par CKD\par stable\par \par glaucoma\par ophthalmology\par \par osteoporosis\par not on meds\par was on fosamax\par \par chronic back pain\par on and off depending on what she does

## 2022-06-28 LAB
25(OH)D3 SERPL-MCNC: 35.6 NG/ML
ALBUMIN SERPL ELPH-MCNC: 4.7 G/DL
ALP BLD-CCNC: 93 U/L
ALT SERPL-CCNC: 11 U/L
ANION GAP SERPL CALC-SCNC: 13 MMOL/L
AST SERPL-CCNC: 22 U/L
BASOPHILS # BLD AUTO: 0.04 K/UL
BASOPHILS NFR BLD AUTO: 0.6 %
BILIRUB SERPL-MCNC: 0.8 MG/DL
BUN SERPL-MCNC: 30 MG/DL
CALCIUM SERPL-MCNC: 10.5 MG/DL
CHLORIDE SERPL-SCNC: 104 MMOL/L
CHOLEST SERPL-MCNC: 144 MG/DL
CO2 SERPL-SCNC: 23 MMOL/L
CREAT SERPL-MCNC: 1.13 MG/DL
EGFR: 49 ML/MIN/1.73M2
EOSINOPHIL # BLD AUTO: 0.22 K/UL
EOSINOPHIL NFR BLD AUTO: 3.1 %
ESTIMATED AVERAGE GLUCOSE: 105 MG/DL
FOLATE SERPL-MCNC: 12.5 NG/ML
GLUCOSE SERPL-MCNC: 101 MG/DL
HBA1C MFR BLD HPLC: 5.3 %
HCT VFR BLD CALC: 39.6 %
HDLC SERPL-MCNC: 54 MG/DL
HGB BLD-MCNC: 13 G/DL
IMM GRANULOCYTES NFR BLD AUTO: 0.3 %
LDLC SERPL CALC-MCNC: 78 MG/DL
LYMPHOCYTES # BLD AUTO: 1.41 K/UL
LYMPHOCYTES NFR BLD AUTO: 19.8 %
MAN DIFF?: NORMAL
MCHC RBC-ENTMCNC: 29.8 PG
MCHC RBC-ENTMCNC: 32.8 GM/DL
MCV RBC AUTO: 90.8 FL
MONOCYTES # BLD AUTO: 0.46 K/UL
MONOCYTES NFR BLD AUTO: 6.5 %
NEUTROPHILS # BLD AUTO: 4.98 K/UL
NEUTROPHILS NFR BLD AUTO: 69.7 %
NONHDLC SERPL-MCNC: 90 MG/DL
PLATELET # BLD AUTO: 263 K/UL
POTASSIUM SERPL-SCNC: 4.4 MMOL/L
PROT SERPL-MCNC: 7.6 G/DL
RBC # BLD: 4.36 M/UL
RBC # FLD: 12.4 %
SODIUM SERPL-SCNC: 140 MMOL/L
TRIGL SERPL-MCNC: 63 MG/DL
TSH SERPL-ACNC: 1.84 UIU/ML
URATE SERPL-MCNC: 7 MG/DL
VIT B12 SERPL-MCNC: 480 PG/ML
WBC # FLD AUTO: 7.13 K/UL

## 2022-06-29 ENCOUNTER — APPOINTMENT (OUTPATIENT)
Dept: CARDIOLOGY | Facility: CLINIC | Age: 82
End: 2022-06-29

## 2022-08-18 ENCOUNTER — APPOINTMENT (OUTPATIENT)
Dept: INTERNAL MEDICINE | Facility: CLINIC | Age: 82
End: 2022-08-18

## 2022-08-18 VITALS
DIASTOLIC BLOOD PRESSURE: 86 MMHG | RESPIRATION RATE: 16 BRPM | WEIGHT: 142 LBS | HEIGHT: 63 IN | SYSTOLIC BLOOD PRESSURE: 166 MMHG | TEMPERATURE: 97.1 F | BODY MASS INDEX: 25.16 KG/M2

## 2022-08-18 VITALS — HEART RATE: 62 BPM | OXYGEN SATURATION: 98 %

## 2022-08-18 VITALS — DIASTOLIC BLOOD PRESSURE: 74 MMHG | SYSTOLIC BLOOD PRESSURE: 142 MMHG

## 2022-08-18 PROCEDURE — 99214 OFFICE O/P EST MOD 30 MIN: CPT

## 2022-08-18 NOTE — HISTORY OF PRESENT ILLNESS
[No Pertinent Cardiac History] : no history of aortic stenosis, atrial fibrillation, coronary artery disease, recent myocardial infarction, or implantable device/pacemaker [No Pertinent Pulmonary History] : no history of asthma, COPD, sleep apnea, or smoking [No Adverse Anesthesia Reaction] : no adverse anesthesia reaction in self or family member [Chronic Anticoagulation] : no chronic anticoagulation [Chronic Kidney Disease] : chronic kidney disease [Diabetes] : no diabetes [(Patient denies any chest pain, claudication, dyspnea on exertion, orthopnea, palpitations or syncope)] : Patient denies any chest pain, claudication, dyspnea on exertion, orthopnea, palpitations or syncope [FreeTextEntry1] : OS cataract [FreeTextEntry2] : 8/25/22 [FreeTextEntry3] : Dr Manning [FreeTextEntry4] : Pt is here for preop prior to cataract surgery. She has HTN, hyperlipidemia, CKD which are stable.

## 2022-09-13 ENCOUNTER — APPOINTMENT (OUTPATIENT)
Dept: CARDIOLOGY | Facility: CLINIC | Age: 82
End: 2022-09-13

## 2022-09-13 ENCOUNTER — NON-APPOINTMENT (OUTPATIENT)
Age: 82
End: 2022-09-13

## 2022-09-13 VITALS
HEART RATE: 60 BPM | WEIGHT: 144 LBS | DIASTOLIC BLOOD PRESSURE: 83 MMHG | BODY MASS INDEX: 25.52 KG/M2 | HEIGHT: 63 IN | SYSTOLIC BLOOD PRESSURE: 181 MMHG | OXYGEN SATURATION: 97 %

## 2022-09-13 VITALS — DIASTOLIC BLOOD PRESSURE: 80 MMHG | SYSTOLIC BLOOD PRESSURE: 170 MMHG

## 2022-09-13 DIAGNOSIS — R03.0 ELEVATED BLOOD-PRESSURE READING, W/OUT DIAGNOSIS OF HYPERTENSION: ICD-10-CM

## 2022-09-13 PROCEDURE — 93000 ELECTROCARDIOGRAM COMPLETE: CPT

## 2022-09-13 PROCEDURE — 99215 OFFICE O/P EST HI 40 MIN: CPT

## 2022-09-17 ENCOUNTER — APPOINTMENT (OUTPATIENT)
Dept: INTERNAL MEDICINE | Facility: CLINIC | Age: 82
End: 2022-09-17

## 2022-09-17 VITALS
RESPIRATION RATE: 16 BRPM | BODY MASS INDEX: 25.34 KG/M2 | WEIGHT: 143 LBS | TEMPERATURE: 96.7 F | HEIGHT: 63 IN | OXYGEN SATURATION: 96 % | HEART RATE: 62 BPM | SYSTOLIC BLOOD PRESSURE: 170 MMHG | DIASTOLIC BLOOD PRESSURE: 84 MMHG

## 2022-09-17 VITALS — SYSTOLIC BLOOD PRESSURE: 158 MMHG | DIASTOLIC BLOOD PRESSURE: 66 MMHG

## 2022-09-17 PROCEDURE — 99214 OFFICE O/P EST MOD 30 MIN: CPT

## 2022-09-17 NOTE — HISTORY OF PRESENT ILLNESS
[Aortic Stenosis] : no aortic stenosis [Atrial Fibrillation] : no atrial fibrillation [Coronary Artery Disease] : no coronary artery disease [Recent Myocardial Infarction] : no recent myocardial infarction [Implantable Device/Pacemaker] : no implantable device/pacemaker [No Pertinent Pulmonary History] : no history of asthma, COPD, sleep apnea, or smoking [No Adverse Anesthesia Reaction] : no adverse anesthesia reaction in self or family member [Chronic Anticoagulation] : no chronic anticoagulation [Chronic Kidney Disease] : no chronic kidney disease [Diabetes] : no diabetes [(Patient denies any chest pain, claudication, dyspnea on exertion, orthopnea, palpitations or syncope)] : Patient denies any chest pain, claudication, dyspnea on exertion, orthopnea, palpitations or syncope [FreeTextEntry1] : cataract OS [FreeTextEntry2] : 9/22/22 [FreeTextEntry3] : Dr Manning [FreeTextEntry4] : Pt is here for MCA for OS cataract. \par Has HTN and BP is high at the office.

## 2022-09-18 LAB
COVID-19 NUCLEOCAPSID  GAM ANTIBODY INTERPRETATION: NEGATIVE
COVID-19 SPIKE DOMAIN ANTIBODY INTERPRETATION: POSITIVE
SARS-COV-2 AB SERPL IA-ACNC: 168 U/ML
SARS-COV-2 AB SERPL QL IA: 0.37 INDEX

## 2022-09-27 ENCOUNTER — APPOINTMENT (OUTPATIENT)
Dept: CARDIOLOGY | Facility: CLINIC | Age: 82
End: 2022-09-27

## 2022-09-27 PROCEDURE — 93306 TTE W/DOPPLER COMPLETE: CPT

## 2022-09-27 PROCEDURE — 93790 AMBL BP MNTR W/SW I&R: CPT

## 2022-12-07 DIAGNOSIS — N39.0 URINARY TRACT INFECTION, SITE NOT SPECIFIED: ICD-10-CM

## 2023-03-14 ENCOUNTER — NON-APPOINTMENT (OUTPATIENT)
Age: 83
End: 2023-03-14

## 2023-03-14 ENCOUNTER — APPOINTMENT (OUTPATIENT)
Dept: CARDIOLOGY | Facility: CLINIC | Age: 83
End: 2023-03-14
Payer: MEDICARE

## 2023-03-14 VITALS
BODY MASS INDEX: 25.69 KG/M2 | HEART RATE: 61 BPM | WEIGHT: 145 LBS | OXYGEN SATURATION: 98 % | HEIGHT: 63 IN | DIASTOLIC BLOOD PRESSURE: 76 MMHG | SYSTOLIC BLOOD PRESSURE: 148 MMHG

## 2023-03-14 DIAGNOSIS — I49.1 ATRIAL PREMATURE DEPOLARIZATION: ICD-10-CM

## 2023-03-14 PROCEDURE — 99215 OFFICE O/P EST HI 40 MIN: CPT

## 2023-03-14 PROCEDURE — 93000 ELECTROCARDIOGRAM COMPLETE: CPT

## 2023-07-25 ENCOUNTER — INPATIENT (INPATIENT)
Facility: HOSPITAL | Age: 83
LOS: 1 days | Discharge: HOME | DRG: 309 | End: 2023-07-27
Attending: INTERNAL MEDICINE | Admitting: INTERNAL MEDICINE
Payer: MEDICARE

## 2023-07-25 VITALS
DIASTOLIC BLOOD PRESSURE: 73 MMHG | HEART RATE: 92 BPM | WEIGHT: 145.06 LBS | OXYGEN SATURATION: 96 % | SYSTOLIC BLOOD PRESSURE: 131 MMHG | TEMPERATURE: 98 F | RESPIRATION RATE: 18 BRPM | HEIGHT: 63 IN

## 2023-07-25 DIAGNOSIS — Z98.49 CATARACT EXTRACTION STATUS, UNSPECIFIED EYE: Chronic | ICD-10-CM

## 2023-07-25 DIAGNOSIS — I48.91 UNSPECIFIED ATRIAL FIBRILLATION: ICD-10-CM

## 2023-07-25 DIAGNOSIS — Z98.89 OTHER SPECIFIED POSTPROCEDURAL STATES: Chronic | ICD-10-CM

## 2023-07-25 LAB
ALBUMIN SERPL ELPH-MCNC: 4.3 G/DL — SIGNIFICANT CHANGE UP (ref 3.3–5)
ALP SERPL-CCNC: 100 U/L — SIGNIFICANT CHANGE UP (ref 40–120)
ALT FLD-CCNC: 26 U/L — SIGNIFICANT CHANGE UP (ref 12–78)
ANION GAP SERPL CALC-SCNC: 5 MMOL/L — SIGNIFICANT CHANGE UP (ref 5–17)
AST SERPL-CCNC: 24 U/L — SIGNIFICANT CHANGE UP (ref 15–37)
BASOPHILS # BLD AUTO: 0.04 K/UL — SIGNIFICANT CHANGE UP (ref 0–0.2)
BASOPHILS NFR BLD AUTO: 0.6 % — SIGNIFICANT CHANGE UP (ref 0–2)
BILIRUB SERPL-MCNC: 0.7 MG/DL — SIGNIFICANT CHANGE UP (ref 0.2–1.2)
BUN SERPL-MCNC: 31 MG/DL — HIGH (ref 7–23)
CALCIUM SERPL-MCNC: 9.7 MG/DL — SIGNIFICANT CHANGE UP (ref 8.5–10.1)
CHLORIDE SERPL-SCNC: 111 MMOL/L — HIGH (ref 96–108)
CO2 SERPL-SCNC: 26 MMOL/L — SIGNIFICANT CHANGE UP (ref 22–31)
CREAT SERPL-MCNC: 1.2 MG/DL — SIGNIFICANT CHANGE UP (ref 0.5–1.3)
EGFR: 45 ML/MIN/1.73M2 — LOW
EOSINOPHIL # BLD AUTO: 0.17 K/UL — SIGNIFICANT CHANGE UP (ref 0–0.5)
EOSINOPHIL NFR BLD AUTO: 2.6 % — SIGNIFICANT CHANGE UP (ref 0–6)
GLUCOSE SERPL-MCNC: 124 MG/DL — HIGH (ref 70–99)
HCT VFR BLD CALC: 39.7 % — SIGNIFICANT CHANGE UP (ref 34.5–45)
HGB BLD-MCNC: 12.7 G/DL — SIGNIFICANT CHANGE UP (ref 11.5–15.5)
IMM GRANULOCYTES NFR BLD AUTO: 0.3 % — SIGNIFICANT CHANGE UP (ref 0–0.9)
LYMPHOCYTES # BLD AUTO: 1.35 K/UL — SIGNIFICANT CHANGE UP (ref 1–3.3)
LYMPHOCYTES # BLD AUTO: 20.7 % — SIGNIFICANT CHANGE UP (ref 13–44)
MCHC RBC-ENTMCNC: 30 PG — SIGNIFICANT CHANGE UP (ref 27–34)
MCHC RBC-ENTMCNC: 32 GM/DL — SIGNIFICANT CHANGE UP (ref 32–36)
MCV RBC AUTO: 93.6 FL — SIGNIFICANT CHANGE UP (ref 80–100)
MONOCYTES # BLD AUTO: 0.4 K/UL — SIGNIFICANT CHANGE UP (ref 0–0.9)
MONOCYTES NFR BLD AUTO: 6.1 % — SIGNIFICANT CHANGE UP (ref 2–14)
NEUTROPHILS # BLD AUTO: 4.53 K/UL — SIGNIFICANT CHANGE UP (ref 1.8–7.4)
NEUTROPHILS NFR BLD AUTO: 69.7 % — SIGNIFICANT CHANGE UP (ref 43–77)
NRBC # BLD: 0 /100 WBCS — SIGNIFICANT CHANGE UP (ref 0–0)
PLATELET # BLD AUTO: 264 K/UL — SIGNIFICANT CHANGE UP (ref 150–400)
POTASSIUM SERPL-MCNC: 4.4 MMOL/L — SIGNIFICANT CHANGE UP (ref 3.5–5.3)
POTASSIUM SERPL-SCNC: 4.4 MMOL/L — SIGNIFICANT CHANGE UP (ref 3.5–5.3)
PROT SERPL-MCNC: 8 G/DL — SIGNIFICANT CHANGE UP (ref 6–8.3)
RBC # BLD: 4.24 M/UL — SIGNIFICANT CHANGE UP (ref 3.8–5.2)
RBC # FLD: 12.4 % — SIGNIFICANT CHANGE UP (ref 10.3–14.5)
SODIUM SERPL-SCNC: 142 MMOL/L — SIGNIFICANT CHANGE UP (ref 135–145)
TROPONIN I, HIGH SENSITIVITY RESULT: 6.6 NG/L — SIGNIFICANT CHANGE UP
WBC # BLD: 6.51 K/UL — SIGNIFICANT CHANGE UP (ref 3.8–10.5)
WBC # FLD AUTO: 6.51 K/UL — SIGNIFICANT CHANGE UP (ref 3.8–10.5)

## 2023-07-25 PROCEDURE — 99285 EMERGENCY DEPT VISIT HI MDM: CPT

## 2023-07-25 PROCEDURE — 70496 CT ANGIOGRAPHY HEAD: CPT | Mod: 26

## 2023-07-25 PROCEDURE — 71045 X-RAY EXAM CHEST 1 VIEW: CPT | Mod: 26

## 2023-07-25 PROCEDURE — 99223 1ST HOSP IP/OBS HIGH 75: CPT

## 2023-07-25 PROCEDURE — 99223 1ST HOSP IP/OBS HIGH 75: CPT | Mod: GC

## 2023-07-25 PROCEDURE — 70450 CT HEAD/BRAIN W/O DYE: CPT | Mod: 26,ME,59

## 2023-07-25 PROCEDURE — G1004: CPT

## 2023-07-25 RX ORDER — ATORVASTATIN CALCIUM 80 MG/1
40 TABLET, FILM COATED ORAL AT BEDTIME
Refills: 0 | Status: DISCONTINUED | OUTPATIENT
Start: 2023-07-25 | End: 2023-07-27

## 2023-07-25 RX ORDER — APIXABAN 2.5 MG/1
5 TABLET, FILM COATED ORAL
Refills: 0 | Status: DISCONTINUED | OUTPATIENT
Start: 2023-07-25 | End: 2023-07-27

## 2023-07-25 RX ORDER — MECLIZINE HCL 12.5 MG
1 TABLET ORAL
Qty: 12 | Refills: 0
Start: 2023-07-25 | End: 2023-07-28

## 2023-07-25 RX ORDER — MECLIZINE HCL 12.5 MG
25 TABLET ORAL ONCE
Refills: 0 | Status: COMPLETED | OUTPATIENT
Start: 2023-07-25 | End: 2023-07-25

## 2023-07-25 RX ORDER — SODIUM CHLORIDE 9 MG/ML
1000 INJECTION INTRAMUSCULAR; INTRAVENOUS; SUBCUTANEOUS ONCE
Refills: 0 | Status: COMPLETED | OUTPATIENT
Start: 2023-07-25 | End: 2023-07-25

## 2023-07-25 RX ORDER — SODIUM CHLORIDE 9 MG/ML
1000 INJECTION INTRAMUSCULAR; INTRAVENOUS; SUBCUTANEOUS
Refills: 0 | Status: DISCONTINUED | OUTPATIENT
Start: 2023-07-25 | End: 2023-07-27

## 2023-07-25 RX ORDER — AMLODIPINE BESYLATE 2.5 MG/1
10 TABLET ORAL DAILY
Refills: 0 | Status: DISCONTINUED | OUTPATIENT
Start: 2023-07-25 | End: 2023-07-27

## 2023-07-25 RX ORDER — METOPROLOL TARTRATE 50 MG
50 TABLET ORAL
Refills: 0 | Status: DISCONTINUED | OUTPATIENT
Start: 2023-07-25 | End: 2023-07-27

## 2023-07-25 RX ORDER — MECLIZINE HCL 12.5 MG
25 TABLET ORAL THREE TIMES A DAY
Refills: 0 | Status: DISCONTINUED | OUTPATIENT
Start: 2023-07-25 | End: 2023-07-27

## 2023-07-25 RX ADMIN — SODIUM CHLORIDE 60 MILLILITER(S): 9 INJECTION INTRAMUSCULAR; INTRAVENOUS; SUBCUTANEOUS at 21:25

## 2023-07-25 RX ADMIN — APIXABAN 5 MILLIGRAM(S): 2.5 TABLET, FILM COATED ORAL at 18:30

## 2023-07-25 RX ADMIN — ATORVASTATIN CALCIUM 40 MILLIGRAM(S): 80 TABLET, FILM COATED ORAL at 21:25

## 2023-07-25 RX ADMIN — Medication 25 MILLIGRAM(S): at 09:36

## 2023-07-25 RX ADMIN — Medication 50 MILLIGRAM(S): at 18:30

## 2023-07-25 RX ADMIN — SODIUM CHLORIDE 1000 MILLILITER(S): 9 INJECTION INTRAMUSCULAR; INTRAVENOUS; SUBCUTANEOUS at 12:14

## 2023-07-25 NOTE — ED PROVIDER NOTE - CARE PLAN
1 Principal Discharge DX:	Benign positional vertigo   Principal Discharge DX:	New onset atrial fibrillation  Secondary Diagnosis:	Vertigo

## 2023-07-25 NOTE — H&P ADULT - NSICDXPASTMEDICALHX_GEN_ALL_CORE_FT
PAST MEDICAL HISTORY:  Bladder disorder     Hypertension      PAST MEDICAL HISTORY:  Bladder disorder     Hyperlipidemia     Hypertension     Osteoarthritis

## 2023-07-25 NOTE — H&P ADULT - NSHPREVIEWOFSYSTEMS_GEN_ALL_CORE
Constitutional: denies fever, chills, general malaise  HEENT: denies dry mouth, sore throat, runny nose  Respiratory: denies SOB, SANTACRUZ, cough  Cardiovascular: denies CP, palpitations, edema  Gastrointestinal: denies nausea, vomiting, diarrhea, constipation, abdominal pain  Genitourinary: denies dysuria, frequency  Musculoskeletal: denies myalgias, arthralgias   Neurologic: denies syncope, LOC, headache, weakness. +dizziness  Psychiatric: denies feeling anxious, depressed  ROS negative except as noted above Constitutional: denies fever, chills, general malaise  HEENT: denies dry mouth, sore throat, runny nose  Respiratory: denies SOB, SANTACRUZ, cough  Cardiovascular: denies CP, palpitations, edema  Gastrointestinal: denies nausea, vomiting, diarrhea, constipation, abdominal pain +BM x 2 in ED.   Genitourinary: denies dysuria, +frequency  Musculoskeletal: denies myalgias, arthralgias   Neurologic: denies syncope, LOC, headache, weakness. +dizziness  Psychiatric: denies feeling anxious, depressed  ROS negative except as noted above

## 2023-07-25 NOTE — H&P ADULT - NSHPSOCIALHISTORY_GEN_ALL_CORE
from home, lives alone   denies etoh, tobacco, recreational drugs  ambulates independently   ADLs independent

## 2023-07-25 NOTE — H&P ADULT - HISTORY OF PRESENT ILLNESS
83 yo f pmh HTN presents with complaint of dizziness. Pt was previously in her normal state of health and this am she woek up with sudden onset dizziness. Dizziness described as spinning sensation. Denies fall, LOC, headache, nausea, vomiting.     In the ED cbc, cmp, troponin, CT head, cxr and EKG were performed. labs were grossly wnl. CT head Moderate chronic microvascular changes without evidence of an acute transcortical infarction or hemorrhage. CXR neg.  EKG Atrial fibrillation. Nonspecific ST and T wave abnormality 94bpm  Pt was given 1L NS and meclizine x 1.  83 yo f pmh HTN, HLD, osteoarthritis presents with complaint of dizziness. Pt was previously in her normal state of health and this am she woke up with sudden onset dizziness. Dizziness described as spinning sensation. lasted about 5-10 mins and then improved. Throughout the day pt continued to have intermittent dizziness but not as severe as it was when she first work up. During the initial event pt states she felt like she needed to have a bowel movement. Denies fall, LOC, headache, nausea, vomiting. No chest pain, sob, visual changes.  Pt states that a while back she was evaluated by her physician in the office and was told she was in afib and was sent to the hospital. When she was evaluated in the hospital she was in sinus rhythm and discharged. she was never again told that she had an abnormal ekg.    In the ED cbc, cmp, troponin, CT head, cxr and EKG were performed. labs were grossly wnl. CT head Moderate chronic microvascular changes without evidence of an acute transcortical infarction or hemorrhage. CXR neg.  EKG Atrial fibrillation. Nonspecific ST and T wave abnormality 94bpm  Pt was given 1L NS and meclizine x 1.

## 2023-07-25 NOTE — CONSULT NOTE ADULT - ASSESSMENT
Assessment/Plan: This is an 81 y/o F with HTN, HLD, diastolic HF, PAD, gout, supraventricular ectopies, carotid atherosclerosis, and glaucoma presented to the ED c/o dizziness, found to be in new onset of Afib    New Onset Afib/Dizziness  - No known Hx of Afib.  Follows with   - EKG showed Afib with controlled rate.  Tele shows Afib with rates at 80's-low 100's  - Takes Lopressor 25 mg q12H at home.  Would increase to 50 mg q12H.  Can give extra dose of 25 mg x 1 now  - No evidence of Afib from outpatient EKG's.  Follows Dr. Kruse  - [id number] score is elevated at 5.  Initiate DOAC, ELiquis vs Xarelto  - Had a TTE (10/2022) showing EF 60-65%, severe LAE, and moderate DD  - No evidence of volume overload    - Her dizziness appear to be vertigo.  Ok with Meclizine  - CT head with no acute pathology  - Would obtain orthostatic VS  - She is underhydrating herself.  Can give IV fluids  - Would ambulate to assess symptomatology    - On home Norvasc, Lopressor, and Losartan  - Would hold ARB for now to allow room for AVN blocker titration  - If positive orthostatic, would hold or reduce Norvasc   - Monitor  electrolytes, replete to keep K>4 and Mag>2  - If negative w/u and asymptomatic, she can be discharged from cardiac standpoint.  Advised to f/u with Dr. Kruse  - If admitted, will follow    Marilu Gray DNP, NP-C, AGACNP-C  Cardiology  Call TEAMS         Assessment/Plan: This is an 83 y/o F with HTN, HLD, diastolic HF, PAD, gout, supraventricular ectopies, carotid atherosclerosis, and glaucoma presented to the ED c/o dizziness, found to be in new onset of Afib    New Onset Afib/Dizziness  - No known Hx of Afib.  Follows with Dr. Kruse   - EKG showed Afib with controlled rate.  Tele shows Afib with rates at 80's-low 100's  - Takes Lopressor 25 mg q12H at home.  Would increase to 50 mg q12H.  Can give extra dose of 25 mg x 1 now  - No evidence of Afib from outpatient EKG's.  Follows Dr. Kruse  - [id number] score is elevated at 5.  Initiate DOAC, ELiquis vs Xarelto  - Had a TTE (10/2022) showing EF 60-65%, severe LAE, and moderate DD  - No evidence of volume overload    - Her dizziness appear to be vertigo.  Ok with Meclizine  - CT head with no acute pathology  - Would obtain orthostatic VS  - She is underhydrating herself.  Can give IV fluids  - Would ambulate to assess symptomatology    - On home Norvasc, Lopressor, and Losartan  - Would hold ARB for now to allow room for AVN blocker titration  - If positive orthostatic, would hold or reduce Norvasc   - Monitor  electrolytes, replete to keep K>4 and Mag>2  - If negative w/u and asymptomatic, she can be discharged from cardiac standpoint.  Advised to f/u with Dr. Kruse  - If admitted, will follow    Marilu Gray DNP, NP-C, AGACNP-C  Cardiology  Call TEAMS

## 2023-07-25 NOTE — ED PROVIDER NOTE - CHPI ED SYMPTOMS NEG
no blurred vision/no confusion/no loss of consciousness/no nausea/no numbness/no change in level of consciousness

## 2023-07-25 NOTE — H&P ADULT - NSHPPHYSICALEXAM_GEN_ALL_CORE
Vital Signs Last 24 Hrs  T(C): 36.4 (25 Jul 2023 08:21), Max: 36.4 (25 Jul 2023 08:21)  T(F): 97.6 (25 Jul 2023 08:21), Max: 97.6 (25 Jul 2023 08:21)  HR: 80 (25 Jul 2023 13:42) (80 - 92)  BP: 146/70 (25 Jul 2023 13:42) (131/73 - 146/70)  BP(mean): --  RR: 18 (25 Jul 2023 13:42) (18 - 18)  SpO2: 98% (25 Jul 2023 13:42) (96% - 98%)    Parameters below as of 25 Jul 2023 13:42  Patient On (Oxygen Delivery Method): room air Vital Signs Last 24 Hrs  T(C): 36.4 (25 Jul 2023 08:21), Max: 36.4 (25 Jul 2023 08:21)  T(F): 97.6 (25 Jul 2023 08:21), Max: 97.6 (25 Jul 2023 08:21)  HR: 80 (25 Jul 2023 13:42) (80 - 92)  BP: 146/70 (25 Jul 2023 13:42) (131/73 - 146/70)  BP(mean): --  RR: 18 (25 Jul 2023 13:42) (18 - 18)  SpO2: 98% (25 Jul 2023 13:42) (96% - 98%)    Parameters below as of 25 Jul 2023 13:42  Patient On (Oxygen Delivery Method): room air    General: Well developed, well nourished, NAD  HEENT: NCAT, EOMI bl, moist mucous membranes . no nystagmus  Neck: Supple, nontender, no mass  Neurology: A&Ox3, nonfocal  Respiratory: CTA B/L, No W/R/R  CV:irregular, +S1/S2, no murmurs, rubs or gallops  Abdominal: Soft, NT, ND +BSx4  Extremities: No C/C/E, + peripheral pulses  Skin: warm, dry, normal color

## 2023-07-25 NOTE — ED ADULT NURSE NOTE - BEFAST SPEECH SLURRED
CC/HPI:  Santos Wilson Jr. is a 51 year old male that presents for followup    HISTORY OF PRESENT ILLNESS: 51 yr old male here for followup    /72-Taking losartan 100 mg daily  Weight is down to 261 lbs, was over 300 lbs BMI 34.30    He has had pitting edema in both lower extremities-he denies CP,sob, Leg pain, he has chronic BURNHAM    Doing telemedicine visits for Allay -taking trinetellix, abilify,   Last appt was 3 weeks ago, has appt on 9/14/  Follows Katelyn Spicer NP-  He has slip from Psych requesting labs and EKG due to med use    Taking trazodone for sleep, stopped dayvigo    Negative cologuard in 4/21-no rectal bleeding    EKG SB 54,  , no ST elev/depressions    Back pain overall has been better-Gets right radicular pain  follows Sweetie Gardiner NP and Dr. Jacobson  Getting injection 8/30      Patient Active Problem List   Diagnosis   • Allergic rhinitis, cause unspecified   • Reflux esophagitis   • Headache(784.0)   • Myopia with presbyopia   • Depression with anxiety   • Trigeminal neuralgia   • Essential hypertension   • Alcohol abuse   • Lumbar back pain   • Lumbar radiculopathy, chronic   • Coccyx pain   • Facet hypertrophy of lumbar region   • Lumbar disc disease with radiculopathy   • Abnormal MRI, lumbar spine   • Elevated liver function tests   • Biliuria   • Hyperlipidemia, mixed   • Erectile dysfunction   • Elevated glucose   • Macrocytosis       Past Medical History:   Diagnosis Date   • Alcohol abuse    • Anxiety and depression    • Esophageal reflux    • Essential hypertension 9/1/2019   • Headache(784.0)     CTTH w/ MOH (Advil)   • Reflux esophagitis        Past Surgical History:   Procedure Laterality Date   • Joint replacement Left     Hip   • Removal of tonsils,<12 y/o     • Total wrist replacement  01/01/1988    plate placement       Family History   Problem Relation Age of Onset   • Diabetes Mother         dx age 16   • Other Mother         degenerative discs in the back   •  Cataracts Mother    • Cancer Father           esophagial  cancer      age 62  - smoker   • Asthma Brother    • Alcohol Abuse Brother    • Asthma Maternal Grandmother    • Hypertension Maternal Grandfather    • Ophthalmology Maternal Grandfather         detached retin   • NEGATIVE FAMILY HX OF Paternal Grandmother    • Retinal detachment Paternal Grandfather    • Cancer, Prostate Paternal Grandfather         65   • Heart disease Paternal Grandfather        Social History     Socioeconomic History   • Marital status: /Civil Union     Spouse name: Not on file   • Number of children: Not on file   • Years of education: Not on file   • Highest education level: Not on file   Occupational History   • Occupation:      Employer: [employer]   Tobacco Use   • Smoking status: Former Smoker     Packs/day: 1.00     Years: 13.00     Pack years: 13.00     Types: Cigarettes     Quit date: 1997     Years since quittin.6   • Smokeless tobacco: Never Used   Vaping Use   • Vaping Use: never used   Substance and Sexual Activity   • Alcohol use: Not Currently     Alcohol/week: 0.0 standard drinks   • Drug use: Never   • Sexual activity: Yes     Comment: no HIV risk   Other Topics Concern   • Not on file   Social History Narrative   • Not on file     Social Determinants of Health     Financial Resource Strain: Not on file   Food Insecurity: Not on file   Transportation Needs: Not on file   Physical Activity: Not on file   Stress: Not on file   Social Connections: Not on file   Intimate Partner Violence: Not At Risk   • Social Determinants: Intimate Partner Violence Past Fear: No   • Social Determinants: Intimate Partner Violence Current Fear: No       Current Outpatient Medications   Medication Sig Dispense Refill   • pantoprazole (PROTONIX) 40 MG tablet Take 1 tablet by mouth daily. 90 tablet 0   • losartan (COZAAR) 100 MG tablet Take 1 tablet by mouth daily. 90 tablet 1   • traZODone (DESYREL)  100 MG tablet Take 1 tablet by mouth nightly.     • Vitamin D, Ergocalciferol, 1.25 mg (50,000 units) capsule Take 1 capsule by mouth 1 day a week.     • zonisamide (ZONEGRAN) 100 MG capsule Take 6 capsules nightly 180 capsule 5   • meloxicam (MOBIC) 15 MG tablet Take 1 tablet by mouth daily. 30 tablet 5   • methocarbamol (ROBAXIN) 500 MG tablet Take 1 tablet by mouth 4 times daily as needed for Muscle spasms. 120 tablet 5   • ARIPiprazole (ABILIFY) 5 MG tablet      • albuterol 108 (90 Base) MCG/ACT inhaler Inhale 2 puffs into the lungs every 6 hours as needed for Wheezing. 8.5 g 3   • hydrOXYzine (ATARAX) 25 MG tablet TAKE 1 TO 2 TABLETS BY MOUTH EVERY 6 HOURS AS NEEDED FOR ANXIETY     • sildenafil (REVATIO) 20 MG tablet Take 1-2 tabs po 1 hr before intercourse prn 10 tablet 2   • Vortioxetine HBr 20 MG Tab Take 1 tab po daily. Prescribed by psych. 30 tablet    • fluticasone (FLONASE) 50 MCG/ACT nasal spray Spray in each nostril daily.       No current facility-administered medications for this visit.       ALLERGIES:   Allergen Reactions   • Carbamazepine RASH   • Amitriptyline Hcl      Very irritable   • Penicillins      hives   • Tizanidine Other (See Comments)     Low blood pressure       REVIEW OF SYSTEMS:  All other systems are reviewed and are negative except as documented in the HPI.    PHYSICAL EXAM:  Visit Vitals  /72   Pulse 61   Wt 118.4 kg (261 lb)   SpO2 97%   BMI 35.40 kg/m²     General: Alert, in no acute distress.  Skin: Warm with normal turgor, no rash.  Eyes: Eyelids and conjunctivae appear normal, EOM normal, BRIE.  Neck: Supple with no significant adenopathy, no thyromegaly.  No bruits or jugular venous distention.  Lungs: Clear to auscultation, no wheezing, crackles or rhonchi noted.  Heart: Regular rate and rhythm.  S1, S2, no murmur present, no extra sounds.  Abdomen: Soft, nondistended, nontender, no guarding or masses, normoactive bowel   sounds, no abdominal bruits. No  hepatosplenomegaly.  Musculoskeletal: Spine normal.  Extremities: Full range of motion upper and lower extremities, with normal appearing joints.  1+ pitting edema, normal strength in all extremities.  Neurologic: No focal deficits,  normal sensation to soft touch, normal balance.  Psychiatric:  Normal mood and affect.    Santos was seen today for follow-up, office visit and ekg.    Diagnoses and all orders for this visit:    Essential hypertension  -     COMPREHENSIVE METABOLIC PANEL; Future  -     THYROID STIMULATING HORMONE REFLEX; Future    Depression with anxiety    Hyperlipidemia, mixed  -     LIPID PANEL WITH REFLEX; Future    Lumbar disc disease with radiculopathy    Encounter for long-term (current) use of other medications  -     ELECTROCARDIOGRAM 12-LEAD; Future  -     ELECTROCARDIOGRAM 12-LEAD    Peripheral edema  -     COMPREHENSIVE METABOLIC PANEL; Future  -     CBC WITH DIFFERENTIAL; Future  -     THYROID STIMULATING HORMONE REFLEX; Future  -     NT PROBNP; Future    Elevated glucose  -     GLYCOHEMOGLOBIN; Future    Vitamin D deficiency  -     VITAMIN D -25 HYDROXY; Future    Prostate cancer screening  -     PSA; Future    Elevated LFTs  -     COMPREHENSIVE METABOLIC PANEL; Future    Macrocytosis  -     COMPREHENSIVE METABOLIC PANEL; Future  -     CBC WITH DIFFERENTIAL; Future  -     PATHOLOGY SMEAR REVIEW, BLOOD; Future  -     VITAMIN B12 AND FOLATE; Future    Other orders  -     losartan (COZAAR) 100 MG tablet; Take 1 tablet by mouth daily.      BP improved-cont losartan  Cont weight loss  EKG reviewed was normal  Labs ordered  Followup with pain mgt for injection 8/30/22  Followup with Psych   F/u 6 months   No

## 2023-07-25 NOTE — ED PROVIDER NOTE - WET READ LAUNCH FT
There are no Wet Read(s) to document. PAST MEDICAL HISTORY:  Alcohol abuse     CAD (coronary artery disease)     HLD (hyperlipidemia)     HTN (hypertension)

## 2023-07-25 NOTE — H&P ADULT - NSICDXPASTSURGICALHX_GEN_ALL_CORE_FT
PAST SURGICAL HISTORY:  History of dilatation and curettage x 3     PAST SURGICAL HISTORY:  History of dilatation and curettage x 3    S/P cataract surgery

## 2023-07-25 NOTE — H&P ADULT - TIME BILLING
Note written by attending, see above.  Time spent: 75min. More than 50% of the visit was spent counseling the patient on medical condition and coordination of care.

## 2023-07-25 NOTE — ED ADULT NURSE NOTE - CHIEF COMPLAINT QUOTE
I was getting up from my bed and room was spinning around 5:15am. pt neuro intact good strentght/sensation no drift/droop

## 2023-07-25 NOTE — CONSULT NOTE ADULT - NS ATTEND AMEND GEN_ALL_CORE FT
dizziness, which sounds to be vertigo based on description  found to be in af with a controlled rate, which is a new diagnosis  would give ivf  increase metoprolol to 50 bid  start eliquis 2.5 bid if she agrees  meclizine for vertigo  if feeling well with walking, can dc home with outpt fu with Dr. Kruse.

## 2023-07-25 NOTE — ED ADULT TRIAGE NOTE - CHIEF COMPLAINT QUOTE
I was getting up from my bed and room was spinning around 5:15am. pt neuro intact good strentght/sensation no drift/droop I was getting up from my bed and room was spinning around 5:15am. pt neuro intact good strength / sensation no drift/droop.   patient drove herself to ER.  Not dizzy at present.  symptoms resolved.  refusing wheelchair.  ambulated with steady gait to 3A.

## 2023-07-25 NOTE — H&P ADULT - ASSESSMENT
83 yo f pmh htn presenting with dizziness, found to have new atrial fibrillation     #Atrial Fibrillation     #Dizziness   - possibly multifactorial, found to be in new onset afib and with + orthostatics     #HTN 83 yo f pmh htn presenting with dizziness, found to have new atrial fibrillation     #Atrial Fibrillation   - EKG, rate controlled atrial fibrillation  - on home metoprolol 25mg, increase to 50mg BID per cardiology recs   - start eliquis   - monitor on telemetry  - cardiology Dr. Elias following    #Dizziness   - possibly multifactorial, found to be in new onset afib, vertigo vs orthostasis vs dehydration- pt admits to poor po intake   - CT head Moderate chronic microvascular changes without evidence of an acute transcortical infarction or hemorrhage  - meclizine provided minimal relief. will continue for now  - check orthostatic vital signs   - start gentle IVF   - continue to monitor on telemetry , pt currently afib rate controlled- monitor for RVR vs other arrythmias.   - OOB with assist, fall precautions   - Neurology consult Dr. Canseco    #HTN  - bp currently within acceptable range  - on home losartan, norvasc and metoprolol  - metoprolol incraesed to 50BID  - holding losartan for more bp room for avn titration  - continue amlodipine for now.   - check orthostatics     #HLD  - continue statin     #VTE ppx   - starting eliquis

## 2023-07-25 NOTE — CONSULT NOTE ADULT - SUBJECTIVE AND OBJECTIVE BOX
[facility] - Rj Kruse, Daniel Arteaga Savella, Goodger  Office Number: [phone]    Initial Consult Note    CHIEF COMPLAINT: Patient is a 82y old  Female who presents with a chief complaint of     HPI:  This is an 81 y/o F with HTN, HLD, diastolic HF, PAD, gout, supraventricular ectopies, carotid atherosclerosis, and glaucoma presented to the ED c/o dizziness, found to be in new onset of Afib    PAST MEDICAL & SURGICAL HISTORY:  Hypertension  Bladder disorder  History of dilatation and curettage  x 3    SOCIAL HISTORY:  No tobacco, ethanol, or drug abuse.  FAMILY HISTORY:    No family history of acute MI or sudden cardiac death.  MEDICATIONS  (STANDING):    MEDICATIONS  (PRN):    Allergies    penicillins (Rash)  Indocin (Rash)    Intolerances    REVIEW OF SYSTEMS:  CONSTITUTIONAL: No weakness, fevers or chills  EYES/ENT: No visual changes;  No vertigo or throat pain   NECK: No pain or stiffness  RESPIRATORY: No cough, wheezing, hemoptysis; No shortness of breath  CARDIOVASCULAR: No chest pain or palpitations  GASTROINTESTINAL: No abdominal pain. No nausea, vomiting, or hematemesis; No diarrhea or constipation. No melena or hematochezia.  GENITOURINARY: No dysuria, frequency or hematuria  NEUROLOGICAL: No numbness or weakness  SKIN: No itching or rash  All other review of systems is negative unless indicated above  VITAL SIGNS:   Vital Signs Last 24 Hrs  T(C): 36.4 (25 Jul 2023 08:21), Max: 36.4 (25 Jul 2023 08:21)  T(F): 97.6 (25 Jul 2023 08:21), Max: 97.6 (25 Jul 2023 08:21)  HR: 92 (25 Jul 2023 08:21) (92 - 92)  BP: 131/73 (25 Jul 2023 08:21) (131/73 - 131/73)  BP(mean): --  RR: 18 (25 Jul 2023 08:21) (18 - 18)  SpO2: 96% (25 Jul 2023 08:21) (96% - 96%)    Parameters below as of 25 Jul 2023 08:21  Patient On (Oxygen Delivery Method): room air    I&O's Summary    On Exam:  Constitutional: NAD, alert and oriented x 3  Lungs:  Non-labored, breath sounds are clear bilaterally, No wheezing, rales or rhonchi  Cardiovascular: RRR.  S1 and S2 positive.  No murmurs, rubs, gallops or clicks  Gastrointestinal: Bowel Sounds present, soft, nontender.   Lymph: No peripheral edema. No cervical lymphadenopathy.  Neurological: Alert, no focal deficits  Skin: No rashes or ulcers   Psych:  Mood & affect appropriate.    LABS: All Labs Reviewed:    RADIOLOGY:    EKG: Afib    Assessment/Plan: This is an 81 y/o F with HTN, HLD, diastolic HF, PAD, gout, supraventricular ectopies, carotid atherosclerosis, and glaucoma presented to the ED c/o dizziness, found to be in new onset of Afib    New Onset Afib  - No known Hx of Afib.  Follows with   - EKG showed Afib with controlled rate    Start Cardizem gtt at 5 mg/hr; may increase up to 15 mg/hr.  Plan to transition to PO  If needed, can start Lopressor 25 mg q12H; Uptitrate as needed for rate-control  [id number] score =   .  Initiate FD Lovenox (or renally dosed) with eventual switch to DOAC  Obtain TTE to evaluate cardiac structures  Monitor electrolytes, replete to keep K>4 and Mag>2        Marilu Gray DNP, NP-C, AGACNP-C  Cardiology  Call TEAMS       [facility] - Rj Kruse, Daniel Arteaga, Omega Elias  Office Number: [phone]    Initial Consult Note    CHIEF COMPLAINT: Patient is a 82y old  Female who presents with a chief complaint of     HPI:  This is an 81 y/o F with HTN, HLD, diastolic HF, PAD, gout, supraventricular ectopies, carotid atherosclerosis, and glaucoma presented to the ED c/o dizziness, found to be in new onset of Afib.  Patient states, she woke up this morning and got dizzy and felt the the room was spinning after turning her head towards the right. States, it is the first time it happened to her.  Denies syncope.  Denies CP, palpitations, dizziness, lightheadedness, syncope.  Denies SOB, SANTACRUZ or orthopnea.  Denies fever, chills, cough, N/V/diarrhea or abdominal pain.  It was short-lived but decided to come to the hospital, claiming, she drove herself.  Denies being symptomatic since first episode.  In the ED, her EKG revealed Afib with controlled rate, no ischemic changes noted.  CT head unremarkable.      PAST MEDICAL & SURGICAL HISTORY:  Hypertension  Bladder disorder  History of dilatation and curettage  x 3    SOCIAL HISTORY:  No tobacco, ethanol, or drug abuse.  FAMILY HISTORY:    No family history of acute MI or sudden cardiac death.  MEDICATIONS  (STANDING):    MEDICATIONS  (PRN):    Allergies    penicillins (Rash)  Indocin (Rash)    Intolerances    REVIEW OF SYSTEMS:  CONSTITUTIONAL: No weakness, fevers or chills  EYES/ENT: No visual changes;  No vertigo or throat pain   NECK: No pain or stiffness  RESPIRATORY: No cough, wheezing, hemoptysis; No shortness of breath  CARDIOVASCULAR: No chest pain or palpitations  GASTROINTESTINAL: No abdominal pain. No nausea, vomiting, or hematemesis; No diarrhea or constipation. No melena or hematochezia.  GENITOURINARY: No dysuria, frequency or hematuria  NEUROLOGICAL: No numbness or weakness +dizziness  SKIN: No itching or rash  All other review of systems is negative unless indicated above  VITAL SIGNS:   Vital Signs Last 24 Hrs  T(C): 36.4 (25 Jul 2023 08:21), Max: 36.4 (25 Jul 2023 08:21)  T(F): 97.6 (25 Jul 2023 08:21), Max: 97.6 (25 Jul 2023 08:21)  HR: 92 (25 Jul 2023 08:21) (92 - 92)  BP: 131/73 (25 Jul 2023 08:21) (131/73 - 131/73)  BP(mean): --  RR: 18 (25 Jul 2023 08:21) (18 - 18)  SpO2: 96% (25 Jul 2023 08:21) (96% - 96%)    Parameters below as of 25 Jul 2023 08:21  Patient On (Oxygen Delivery Method): room air    I&O's Summary    On Exam:  Constitutional: NAD, alert and oriented x 3  Lungs:  Non-labored, breath sounds are clear bilaterally, No wheezing, rales or rhonchi  Cardiovascular: IRRR.  S1 and S2 positive.  No murmurs, rubs, gallops or clicks  Gastrointestinal: Bowel Sounds present, soft, nontender.   Lymph: No peripheral edema. No cervical lymphadenopathy.  Neurological: Alert, no focal deficits  Skin: No rashes or ulcers   Psych:  Mood & affect appropriate.    LABS: All Labs Reviewed:    RADIOLOGY:    EKG: Afib

## 2023-07-26 LAB
ANION GAP SERPL CALC-SCNC: 3 MMOL/L — LOW (ref 5–17)
BUN SERPL-MCNC: 16 MG/DL — SIGNIFICANT CHANGE UP (ref 7–23)
CALCIUM SERPL-MCNC: 8.9 MG/DL — SIGNIFICANT CHANGE UP (ref 8.5–10.1)
CHLORIDE SERPL-SCNC: 112 MMOL/L — HIGH (ref 96–108)
CO2 SERPL-SCNC: 28 MMOL/L — SIGNIFICANT CHANGE UP (ref 22–31)
CREAT SERPL-MCNC: 0.85 MG/DL — SIGNIFICANT CHANGE UP (ref 0.5–1.3)
EGFR: 68 ML/MIN/1.73M2 — SIGNIFICANT CHANGE UP
GLUCOSE SERPL-MCNC: 85 MG/DL — SIGNIFICANT CHANGE UP (ref 70–99)
HCT VFR BLD CALC: 38.1 % — SIGNIFICANT CHANGE UP (ref 34.5–45)
HGB BLD-MCNC: 12.1 G/DL — SIGNIFICANT CHANGE UP (ref 11.5–15.5)
MAGNESIUM SERPL-MCNC: 2.2 MG/DL — SIGNIFICANT CHANGE UP (ref 1.6–2.6)
MCHC RBC-ENTMCNC: 29.9 PG — SIGNIFICANT CHANGE UP (ref 27–34)
MCHC RBC-ENTMCNC: 31.8 GM/DL — LOW (ref 32–36)
MCV RBC AUTO: 94.1 FL — SIGNIFICANT CHANGE UP (ref 80–100)
NRBC # BLD: 0 /100 WBCS — SIGNIFICANT CHANGE UP (ref 0–0)
PHOSPHATE SERPL-MCNC: 2.5 MG/DL — SIGNIFICANT CHANGE UP (ref 2.5–4.5)
PLATELET # BLD AUTO: 254 K/UL — SIGNIFICANT CHANGE UP (ref 150–400)
POTASSIUM SERPL-MCNC: 4 MMOL/L — SIGNIFICANT CHANGE UP (ref 3.5–5.3)
POTASSIUM SERPL-SCNC: 4 MMOL/L — SIGNIFICANT CHANGE UP (ref 3.5–5.3)
RBC # BLD: 4.05 M/UL — SIGNIFICANT CHANGE UP (ref 3.8–5.2)
RBC # FLD: 12.5 % — SIGNIFICANT CHANGE UP (ref 10.3–14.5)
SODIUM SERPL-SCNC: 143 MMOL/L — SIGNIFICANT CHANGE UP (ref 135–145)
WBC # BLD: 8.34 K/UL — SIGNIFICANT CHANGE UP (ref 3.8–10.5)
WBC # FLD AUTO: 8.34 K/UL — SIGNIFICANT CHANGE UP (ref 3.8–10.5)

## 2023-07-26 PROCEDURE — 99233 SBSQ HOSP IP/OBS HIGH 50: CPT

## 2023-07-26 PROCEDURE — 70551 MRI BRAIN STEM W/O DYE: CPT | Mod: 26

## 2023-07-26 RX ADMIN — AMLODIPINE BESYLATE 10 MILLIGRAM(S): 2.5 TABLET ORAL at 05:37

## 2023-07-26 RX ADMIN — ATORVASTATIN CALCIUM 40 MILLIGRAM(S): 80 TABLET, FILM COATED ORAL at 21:13

## 2023-07-26 RX ADMIN — APIXABAN 5 MILLIGRAM(S): 2.5 TABLET, FILM COATED ORAL at 18:16

## 2023-07-26 RX ADMIN — APIXABAN 5 MILLIGRAM(S): 2.5 TABLET, FILM COATED ORAL at 05:36

## 2023-07-26 RX ADMIN — Medication 50 MILLIGRAM(S): at 18:16

## 2023-07-26 RX ADMIN — Medication 50 MILLIGRAM(S): at 08:52

## 2023-07-26 NOTE — PROGRESS NOTE ADULT - ASSESSMENT
83 y/o F with HTN, HLD, diastolic HF, PAD, gout, supraventricular ectopies, carotid atherosclerosis, and glaucoma presented to the ED c/o dizziness, found to be in new onset of Afib    New Onset Afib/Dizziness  - No known Hx of Afib.    - EKG showed Afib with controlled rate.  Converted to NSR in ED.  SB/SR overnight with no further Afib  - Takes Lopressor 25 mg q12H at home.  Continue higher dose of Lopressor at 50 mg q12H.   - No evidence of Afib from outpatient EKG's.  Follows Dr. Kruse  - [id number] score is elevated at 6.  Continue Eliquis  - Had a TTE (10/2022) showing EF 60-65%, severe LAE, and moderate DD.  No need to repeat unless evidence of volume overload  - No evidence of volume overload    - Her dizziness appear to be vertigo.  Ok with Meclizine  - CT head with no acute pathology  - Can continue IV fluids and encourage PO  - Would ambulate to assess symptomatology    - On home Norvasc, Lopressor, and Losartan  - Continue to hold ARB to allow room for AVN blocker titration  - If needed, can also hold home Norvasc    - Monitor  electrolytes, replete to keep K>4 and Mag>2  - Will continue to follow.  Otherwise, stable from cardiac standpoint.  To follow with Dr. Kruse as outpatient    Marilu Gray DNP, NP-C, AGACNP-C  Cardiology  Call TEAMS

## 2023-07-26 NOTE — CARE COORDINATION ASSESSMENT. - NSPASTMEDSURGHISTORY_GEN_ALL_CORE_FT
PAST MEDICAL & SURGICAL HISTORY:  Bladder disorder      Hypertension      History of dilatation and curettage  x 3      Osteoarthritis      Hyperlipidemia      S/P cataract surgery

## 2023-07-26 NOTE — PROGRESS NOTE ADULT - ASSESSMENT
83 yo f [facility] htn presenting with dizziness, found to have new atrial fibrillation       new onset afib    - on lopressor 50mg BID. HR is mostly in the low to mid 50s. at times does go down to high 40s.     - cont eliquis     - cardio following.     dizziness    - d/w neurology. recommended MRI. patient reports she has a device either stent/shunt in the left eye. not sure if MRI compatible. Patient sees Dr Manning at [facility]. spoke with office staff. reports that patient has a cataract stent in the left eye. no metal and is mri safe. will fax copy of operative report.     - check orthostatics.     HTN    - cont home meds    - metoprolol was increased. losartan was discontinued     DVT proph: on eliquis

## 2023-07-26 NOTE — CARE COORDINATION ASSESSMENT. - OTHER PERTINENT DISCHARGE PLANNING INFORMATION:
Pt presents to ED with c/o dizziness and spinning.  Seen in ED, noted to be new onset afib.  Admitted to tele, started AC and increased doses of home meds, neuro consult ordered MRI.  Confirms PMD is Dr. Munoz

## 2023-07-26 NOTE — CARE COORDINATION ASSESSMENT. - LIVING ARRANGEMENTS, PROFILE
pt has 6 steps into home and has 6 steps/ split level with rails.  Is independent navigating the steps./house

## 2023-07-26 NOTE — CARE COORDINATION ASSESSMENT. - NSCAREPROVIDERS_GEN_ALL_CORE_FT
CARE PROVIDERS:  Accepting Physician: Jesse Robles  Administration: Corazon Richards  Administration: Mehreen Foote  Administration: Bhavesh Walter  Admitting: Jesse Robles  Attending: Jesse Robles  Case Management: Jaun Trevino  Consultant: Kodak Elias  Consultant: Bri Duff  Consultant: Elisha Canseco  Covering Team: Elisha Canseco  ED Attending: Brigitte Mcdowell  ED Nurse: Didi Grewal  Nurse: Adrianne Castro  Nurse: Tamiko Monroy  Nurse: Ana Crystal  Ordered: ADM, User  Ordered: ServiceAccount, SCMMLM  Override: Pamela Ansari  Override: Star Alvarenga  Override: Ana Crystal  PCA/Nursing Assistant: Jennie Rudd  Primary Team: Joel Soriano  Primary Team: Fiorella Puentes  Primary Team: Joie Olson  Primary Team: Marilu Gray  Registered Dietitian: Chuyita Bass  : Ashlee Partida

## 2023-07-27 ENCOUNTER — TRANSCRIPTION ENCOUNTER (OUTPATIENT)
Age: 83
End: 2023-07-27

## 2023-07-27 VITALS
DIASTOLIC BLOOD PRESSURE: 78 MMHG | SYSTOLIC BLOOD PRESSURE: 120 MMHG | HEART RATE: 70 BPM | RESPIRATION RATE: 18 BRPM | OXYGEN SATURATION: 98 % | TEMPERATURE: 98 F

## 2023-07-27 LAB
ALBUMIN SERPL ELPH-MCNC: 3.3 G/DL — SIGNIFICANT CHANGE UP (ref 3.3–5)
ALP SERPL-CCNC: 97 U/L — SIGNIFICANT CHANGE UP (ref 40–120)
ALT FLD-CCNC: 22 U/L — SIGNIFICANT CHANGE UP (ref 12–78)
ANION GAP SERPL CALC-SCNC: 4 MMOL/L — LOW (ref 5–17)
AST SERPL-CCNC: 17 U/L — SIGNIFICANT CHANGE UP (ref 15–37)
BILIRUB SERPL-MCNC: 0.7 MG/DL — SIGNIFICANT CHANGE UP (ref 0.2–1.2)
BUN SERPL-MCNC: 21 MG/DL — SIGNIFICANT CHANGE UP (ref 7–23)
CALCIUM SERPL-MCNC: 8.9 MG/DL — SIGNIFICANT CHANGE UP (ref 8.5–10.1)
CHLORIDE SERPL-SCNC: 108 MMOL/L — SIGNIFICANT CHANGE UP (ref 96–108)
CO2 SERPL-SCNC: 28 MMOL/L — SIGNIFICANT CHANGE UP (ref 22–31)
CREAT SERPL-MCNC: 0.89 MG/DL — SIGNIFICANT CHANGE UP (ref 0.5–1.3)
EGFR: 64 ML/MIN/1.73M2 — SIGNIFICANT CHANGE UP
GLUCOSE SERPL-MCNC: 87 MG/DL — SIGNIFICANT CHANGE UP (ref 70–99)
HCT VFR BLD CALC: 36.5 % — SIGNIFICANT CHANGE UP (ref 34.5–45)
HGB BLD-MCNC: 12.1 G/DL — SIGNIFICANT CHANGE UP (ref 11.5–15.5)
MCHC RBC-ENTMCNC: 30.7 PG — SIGNIFICANT CHANGE UP (ref 27–34)
MCHC RBC-ENTMCNC: 33.2 GM/DL — SIGNIFICANT CHANGE UP (ref 32–36)
MCV RBC AUTO: 92.6 FL — SIGNIFICANT CHANGE UP (ref 80–100)
NRBC # BLD: 0 /100 WBCS — SIGNIFICANT CHANGE UP (ref 0–0)
PLATELET # BLD AUTO: 221 K/UL — SIGNIFICANT CHANGE UP (ref 150–400)
POTASSIUM SERPL-MCNC: 4.1 MMOL/L — SIGNIFICANT CHANGE UP (ref 3.5–5.3)
POTASSIUM SERPL-SCNC: 4.1 MMOL/L — SIGNIFICANT CHANGE UP (ref 3.5–5.3)
PROT SERPL-MCNC: 7.2 G/DL — SIGNIFICANT CHANGE UP (ref 6–8.3)
RBC # BLD: 3.94 M/UL — SIGNIFICANT CHANGE UP (ref 3.8–5.2)
RBC # FLD: 12.1 % — SIGNIFICANT CHANGE UP (ref 10.3–14.5)
SODIUM SERPL-SCNC: 140 MMOL/L — SIGNIFICANT CHANGE UP (ref 135–145)
WBC # BLD: 6.79 K/UL — SIGNIFICANT CHANGE UP (ref 3.8–10.5)
WBC # FLD AUTO: 6.79 K/UL — SIGNIFICANT CHANGE UP (ref 3.8–10.5)

## 2023-07-27 PROCEDURE — 70551 MRI BRAIN STEM W/O DYE: CPT

## 2023-07-27 PROCEDURE — 80053 COMPREHEN METABOLIC PANEL: CPT

## 2023-07-27 PROCEDURE — 85027 COMPLETE CBC AUTOMATED: CPT

## 2023-07-27 PROCEDURE — 99285 EMERGENCY DEPT VISIT HI MDM: CPT

## 2023-07-27 PROCEDURE — 36415 COLL VENOUS BLD VENIPUNCTURE: CPT

## 2023-07-27 PROCEDURE — 93005 ELECTROCARDIOGRAM TRACING: CPT

## 2023-07-27 PROCEDURE — 71045 X-RAY EXAM CHEST 1 VIEW: CPT

## 2023-07-27 PROCEDURE — 99232 SBSQ HOSP IP/OBS MODERATE 35: CPT

## 2023-07-27 PROCEDURE — 70450 CT HEAD/BRAIN W/O DYE: CPT | Mod: ME

## 2023-07-27 PROCEDURE — 70496 CT ANGIOGRAPHY HEAD: CPT | Mod: ME

## 2023-07-27 PROCEDURE — 83735 ASSAY OF MAGNESIUM: CPT

## 2023-07-27 PROCEDURE — 80048 BASIC METABOLIC PNL TOTAL CA: CPT

## 2023-07-27 PROCEDURE — 85025 COMPLETE CBC W/AUTO DIFF WBC: CPT

## 2023-07-27 PROCEDURE — G1004: CPT

## 2023-07-27 PROCEDURE — 84100 ASSAY OF PHOSPHORUS: CPT

## 2023-07-27 PROCEDURE — 84484 ASSAY OF TROPONIN QUANT: CPT

## 2023-07-27 PROCEDURE — 99233 SBSQ HOSP IP/OBS HIGH 50: CPT

## 2023-07-27 RX ORDER — MECLIZINE HCL 12.5 MG
1 TABLET ORAL
Qty: 10 | Refills: 0
Start: 2023-07-27 | End: 2023-07-30

## 2023-07-27 RX ORDER — METOPROLOL TARTRATE 50 MG
1 TABLET ORAL
Qty: 60 | Refills: 0
Start: 2023-07-27

## 2023-07-27 RX ORDER — APIXABAN 2.5 MG/1
1 TABLET, FILM COATED ORAL
Qty: 60 | Refills: 0
Start: 2023-07-27

## 2023-07-27 RX ADMIN — AMLODIPINE BESYLATE 10 MILLIGRAM(S): 2.5 TABLET ORAL at 05:31

## 2023-07-27 RX ADMIN — APIXABAN 5 MILLIGRAM(S): 2.5 TABLET, FILM COATED ORAL at 05:31

## 2023-07-27 NOTE — DISCHARGE NOTE NURSING/CASE MANAGEMENT/SOCIAL WORK - NSDCPEFALRISK_GEN_ALL_CORE
For information on Fall & Injury Prevention, visit: [email or web address] OR  [email or web address] OR  https://www.cdc.gov/steadi/patient.html

## 2023-07-27 NOTE — DISCHARGE NOTE PROVIDER - NSDCMRMEDTOKEN_GEN_ALL_CORE_FT
apixaban 5 mg oral tablet: 1 tab(s) orally 2 times a day  metoprolol tartrate 50 mg oral tablet: 1 tab(s) orally 2 times a day  Norvasc 10 mg oral tablet: 1 orally once a day  rosuvastatin 10 mg oral capsule: 1 orally

## 2023-07-27 NOTE — DISCHARGE NOTE PROVIDER - HOSPITAL COURSE
83 y/o F with history of HTN, HLD, OA who presented to the ED for dizziness. Patient was found to have new onset afib. Patient's home metoprolol was increased to 50mg twice a day. Losartan was discontinued to allow for up-titration of metoprolol. Patient was advised to follow up with cardio for outpatient ECHO. She was started on Eliquis 5mg twice a day.     Patient was seen by neurology. MRI brain was negative for acute infarct. Patient's headache and dizziness improved. Patient was stable for outpatient follow up. D/w neuro, no need for asa 81mg. continue eliquis.     consults:   cardio: Dr Elias   neuro: Dr Canseco

## 2023-07-27 NOTE — DISCHARGE NOTE NURSING/CASE MANAGEMENT/SOCIAL WORK - NSDCPEELIQUISFU_GEN_ALL_CORE
Yury Edwards(Attending)
Go for blood tests as directed. Your doctor will do lab tests at regular visits to monitor the effects of this medicine. Please follow up with your doctor and keep your health care provider appointments.

## 2023-07-27 NOTE — PROGRESS NOTE ADULT - NS ATTEND AMEND GEN_ALL_CORE FT
I have personally seen and examined the patient in detail.  I have spoken to the provider regarding the assessment and plan of care.  I have made changes to the note accordingly.
No signs of significant ischemia or volume overload. cont current care. Further cardiac workup will depend on clinical course.

## 2023-07-27 NOTE — PROGRESS NOTE ADULT - ASSESSMENT
83 y/o F with HTN, HLD, diastolic HF, PAD, gout, supraventricular ectopies, carotid atherosclerosis, and glaucoma presented to the ED c/o dizziness, found to be in new onset of Afib    New Onset Afib/Dizziness  - No known Hx of Afib.    - EKG showed Afib with controlled rate.  Converted to NSR in ED.  SB/SR overnight with no further Afib  - Takes Lopressor 25 mg q12H at home.  Continue higher dose of Lopressor at 50 mg q12H.   - No evidence of Afib from outpatient EKG's.  Follows Dr. Kruse  - [id number] score is elevated at 6.  Continue Eliquis  - Had a TTE (10/2022) showing EF 60-65%, severe LAE, and moderate DD.  No need to repeat as inpt   - No evidence of volume overload  -From a cardiac standpoint the patient may be discharged home  - To follow up w/ cardiology in one week     - Her dizziness appear to be vertigo.  Ok with Meclizine  - CT head with no acute pathology  - Can continue IV fluids and encourage PO  - Would ambulate to assess symptomatology    - On home Norvasc, Lopressor, and Losartan  - Continue to hold ARB to allow room for AVN blocker titration  - If needed, can also hold home Norvasc    - Monitor  electrolytes, replete to keep K>4 and Mag>2  - Will continue to follow.  Otherwise, stable from cardiac standpoint.  To follow with Dr. Kruse as outpatient  -From a cardiac standpoint the patient may be discharged home  - To follow up w/ cardiology in one week     Mich Obrien DNP, ANP-c  Cardiology   Call Teams      83 y/o F with HTN, HLD, diastolic HF, PAD, gout, supraventricular ectopies, carotid atherosclerosis, and glaucoma presented to the ED c/o dizziness, found to be in new onset of Afib    New Onset Afib/Dizziness  - No known Hx of Afib.    - EKG showed Afib with controlled rate.  Converted to NSR in ED.  SB/SR overnight with no further Afib  - Takes Lopressor 25 mg q12H at home.  Continue higher dose of Lopressor at 50 mg q12H.   - No evidence of Afib from outpatient EKG's.  Follows Dr. Kruse  - [id number] score is elevated at 6.  Continue Eliquis  - Had a TTE (10/2022) showing EF 60-65%, severe LAE, and moderate DD.  No need to repeat as inpt   - No evidence of volume overload  - From a cardiac standpoint the patient may be discharged home  - To follow up w/ cardiology in one week     - Her dizziness appear to be vertigo.  Ok with Meclizine  - CT head with no acute pathology  - Can continue IV fluids and encourage PO  - Would ambulate to assess symptomatology    - On home Norvasc, Lopressor, and Losartan  - Continue to hold ARB to allow room for AVN blocker titration  - If needed, can also hold home Norvasc    - Monitor  electrolytes, replete to keep K>4 and Mag>2  - Will continue to follow.  Otherwise, stable from cardiac standpoint.  To follow with Dr. Kruse as outpatient  - From a cardiac standpoint the patient may be discharged home  - To follow up w/ cardiology in one week     Mich Obrien DNP, ANP-c  Cardiology   Call Teams

## 2023-07-27 NOTE — DISCHARGE NOTE PROVIDER - CARE PROVIDERS DIRECT ADDRESSES
,[email or web address]

## 2023-07-27 NOTE — DISCHARGE NOTE PROVIDER - ATTENDING DISCHARGE PHYSICAL EXAMINATION:
GENERAL: NAD, well-developed, well-groomed  HEENT:  anicteric, moist mucous membranes  CHEST/LUNG:  CTA b/l, no rales, wheezes, or rhonchi  HEART:  normal S1S2  ABDOMEN:  BS+, soft, nontender, nondistended  EXTREMITIES: no edema, cyanosis, or calf tenderness  NERVOUS SYSTEM: answers questions and follows commands appropriately  PSYCH: normal affect

## 2023-07-27 NOTE — PROGRESS NOTE ADULT - SUBJECTIVE AND OBJECTIVE BOX
[facility] -- Rj Kruse, Daniel Arteaga Savella, [place]  Office # [phone]    Follow Up:  New Afib    Subjective/Observations: Awake and alert, denies dizziness.  States, she ambulated to the BR with no dizziness.  Denies any form of respiratory or cardiac discomfort.  Tele noted, remains in SB/NSR after conversion    REVIEW OF SYSTEMS: All other review of systems is negative unless indicated above  PAST MEDICAL & SURGICAL HISTORY:  Hypertension  Bladder disorder  Hyperlipidemia  Osteoarthritis  History of dilatation and curettage  x 3  S/P cataract surgery    MEDICATIONS  (STANDING):  amLODIPine   Tablet 10 milliGRAM(s) Oral daily  apixaban 5 milliGRAM(s) Oral two times a day  atorvastatin 40 milliGRAM(s) Oral at bedtime  metoprolol tartrate 50 milliGRAM(s) Oral two times a day  sodium chloride 0.9%. 1000 milliLiter(s) (60 mL/Hr) IV Continuous <Continuous>    MEDICATIONS  (PRN):  meclizine 25 milliGRAM(s) Oral three times a day PRN Dizziness    Allergies    penicillins (Rash)  Indocin (Rash)    Intolerances    Vital Signs Last 24 Hrs  T(C): 36.4 (26 Jul 2023 05:03), Max: 36.7 (25 Jul 2023 19:35)  T(F): 97.6 (26 Jul 2023 05:03), Max: 98 (25 Jul 2023 19:35)  HR: 78 (26 Jul 2023 08:50) (53 - 80)  BP: 127/73 (26 Jul 2023 05:03) (108/53 - 146/70)  BP(mean): --  RR: 17 (26 Jul 2023 05:03) (16 - 18)  SpO2: 94% (26 Jul 2023 07:45) (94% - 98%)    Parameters below as of 26 Jul 2023 07:45  Patient On (Oxygen Delivery Method): room air    I&O's Summary      PHYSICAL EXAM:  TELE: SB/NSR  Constitutional: NAD, awake and alert, well-developed  HEENT: Moist Mucous Membranes, Anicteric  Pulmonary: Non-labored, breath sounds are clear bilaterally, No wheezing, rales or rhonchi  Cardiovascular: Regular, S1 and S2, No murmurs, rubs, gallops or clicks  Gastrointestinal: Bowel Sounds present, soft, nontender.   Lymph: No peripheral edema. No lymphadenopathy.  Skin: No visible rashes or ulcers.  Psych:  Mood & affect appropriate  LABS: All Labs Reviewed:                        12.1   8.34  )-----------( 254      ( 26 Jul 2023 07:00 )             38.1                         12.7   6.51  )-----------( 264      ( 25 Jul 2023 09:00 )             39.7     26 Jul 2023 07:00    143    |  112    |  16     ----------------------------<  85     4.0     |  28     |  0.85   25 Jul 2023 09:00    142    |  111    |  31     ----------------------------<  124    4.4     |  26     |  1.20     Ca    8.9        26 Jul 2023 07:00  Ca    9.7        25 Jul 2023 09:00  Phos  2.5       26 Jul 2023 07:00  Mg     2.2       26 Jul 2023 07:00    TPro  8.0    /  Alb  4.3    /  TBili  0.7    /  DBili  x      /  AST  24     /  ALT  26     /  AlkPhos  100    25 Jul 2023 09:00    12 Lead ECG:   Ventricular Rate 94 BPM    Atrial Rate 78 BPM    QRS Duration 86 ms    Q-T Interval 366 ms    QTC Calculation(Bazett) 457 ms    R Axis -28 degrees    T Axis 20 degrees    Diagnosis Line *** Poor data quality, interpretation may be adversely affected  Atrial fibrillation  Nonspecific ST and T wave abnormality  Abnormal ECG    Confirmed by rohith Elias ([id number]) on 7/25/2023 2:18:00 PM (07-25-23 @ 08:47)       
Neurology Follow up note    PREM [id number]    HPI:  83 yo f pmh HTN, HLD, osteoarthritis presents with complaint of dizziness. Pt was previously in her normal state of health and this am she woke up with sudden onset dizziness. Dizziness described as spinning sensation. lasted about 5-10 mins and then improved. Throughout the day pt continued to have intermittent dizziness but not as severe as it was when she first work up. During the initial event pt states she felt like she needed to have a bowel movement. Denies fall, LOC, headache, nausea, vomiting. No chest pain, sob, visual changes.  Pt states that a while back she was evaluated by her physician in the office and was told she was in afib and was sent to the hospital. When she was evaluated in the hospital she was in sinus rhythm and discharged. she was never again told that she had an abnormal ekg.    In the ED cbc, cmp, troponin, CT head, cxr and EKG were performed. labs were grossly wnl. CT head Moderate chronic microvascular changes without evidence of an acute transcortical infarction or hemorrhage. CXR neg.  EKG Atrial fibrillation. Nonspecific ST and T wave abnormality 94bpm  Pt was given 1L NS and meclizine x 1.  (25 Jul 2023 15:08)      Interval History -no dizziness    Patient is seen, chart was reviewed and case was discussed with the treatment team.  Pt is not in any distress.   Lying on bed comfortably.   .    Vital Signs Last 24 Hrs  T(C): 36.9 (27 Jul 2023 04:43), Max: 36.9 (27 Jul 2023 04:43)  T(F): 98.4 (27 Jul 2023 04:43), Max: 98.4 (27 Jul 2023 04:43)  HR: 61 (27 Jul 2023 07:35) (56 - 66)  BP: 125/69 (27 Jul 2023 04:43) (125/69 - 149/78)  BP(mean): --  RR: 16 (27 Jul 2023 04:43) (16 - 17)  SpO2: 96% (27 Jul 2023 07:35) (94% - 98%)    Parameters below as of 27 Jul 2023 07:35  Patient On (Oxygen Delivery Method): room air            REVIEW OF SYSTEMS:    Constitutional: No fever, weight loss or fatigue  Eyes: No eye pain, visual disturbances, or discharge  ENT:  No difficulty hearing, tinnitus, vertigo; No sinus or throat pain  Neck: No pain or stiffness  Respiratory: No cough, wheezing, chills or hemoptysis  Cardiovascular: No chest pain, palpitations, shortness of breath, dizziness or leg swelling  Gastrointestinal: No abdominal or epigastric pain. No nausea, vomiting or hematemesis;  Genitourinary: No dysuria, frequency, hematuria or incontinence  Neurological: No headaches, memory loss, loss of strength, numbness or tremors  Psychiatric: No depression, anxiety, mood swings or difficulty sleeping  Musculoskeletal: No joint pain or swelling; No muscle, back or extremity pain  Skin: No itching, burning, rashes or lesions   Lymph Nodes: No enlarged glands  Endocrine: No heat or cold intolerance; No hair loss  Allergy and Immunologic: No hives or eczema    On Neurological Examination:    Mental Status - Pt is alert, awake, oriented X3.. Follows commands well and able to answer questions appropriately.Mood and affect  normal    Speech -  Normal.     Cranial Nerves - Pupils 3 mm equal and reactive to light, extraocular eye movements intact. Pt has no visual field deficit.  Pt has no facial asymmetry. Facial sensation is intact.Tongue - is in midline.    Muscle tone - is normal .      Motor Exam - 5/5 all over, No drift. No shaking or tremors.    Sensory Exam - Pin prick, temperature, joint position and vibration are intact on either side. Pt withdraws all extremities equally on stimulation. No asymmetry seen. No complaints of tingling, numbness.    Gait - Able to stand and walk unassisted.        coordination:    Finger to nose: normal      Deep tendon Reflexes - 2 plus all over.        Romberg - Negative.    Neck Supple -  Yes.     MEDICATIONS    amLODIPine   Tablet 10 milliGRAM(s) Oral daily  apixaban 5 milliGRAM(s) Oral two times a day  atorvastatin 40 milliGRAM(s) Oral at bedtime  meclizine 25 milliGRAM(s) Oral three times a day PRN  metoprolol tartrate 50 milliGRAM(s) Oral two times a day  sodium chloride 0.9%. 1000 milliLiter(s) IV Continuous <Continuous>      Allergies    penicillins (Rash)  Indocin (Rash)    Intolerances        LABS:  CBC Full  -  ( 27 Jul 2023 06:40 )  WBC Count : 6.79 K/uL  RBC Count : 3.94 M/uL  Hemoglobin : 12.1 g/dL  Hematocrit : 36.5 %  Platelet Count - Automated : 221 K/uL  Mean Cell Volume : 92.6 fl  Mean Cell Hemoglobin : 30.7 pg  Mean Cell Hemoglobin Concentration : 33.2 gm/dL  Auto Neutrophil # : x  Auto Lymphocyte # : x    Urinalysis Basic - ( 27 Jul 2023 06:40 )    Color: x / Appearance: x / SG: x / pH: x  Gluc: 87 mg/dL / Ketone: x  / Bili: x / Urobili: x   Blood: x / Protein: x / Nitrite: x   Leuk Esterase: x / RBC: x / WBC x   Sq Epi: x / Non Sq Epi: x / Bacteria: x      07-27    140  |  108  |  21  ----------------------------<  87  4.1   |  28  |  0.89    Ca    8.9      27 Jul 2023 06:40  Phos  2.5     07-26  Mg     2.2     07-26    TPro  7.2  /  Alb  3.3  /  TBili  0.7  /  DBili  x   /  AST  17  /  ALT  22  /  AlkPhos  97  07-27    Hemoglobin A1C:     Vitamin B12     RADIOLOGY    < from: CT Angio Head w/ IV Cont (07.25.23 @ 16:18) >    ACC: [id number] EXAM:  CT ANGIO BRAIN (W)AW IC   ORDERED BY: MALACHI GLASS     PROCEDURE DATE:  07/25/2023          INTERPRETATION:  CT angiography of the brain    CLINICAL INDICATION:  TIA, headache increasing in severity    TECHNIQUE: Directaxial CT scanning of the brain was obtained after the   dynamic intravenous injection of 90 cc of Omnipaque 350. 10 cc were   discarded. Sagittal and coronal maximum intensity projection reformats   were provided.  Three-dimensional reconstructions were performed the   radiologist using the [employer] workstation.    Additionally, noncontrast axial CT scanning of the brain was obtained   from the skull base to the vertex. Sagittal and coronal reformats were   provided.    COMPARISON: CT brain 7/25/2023    FINDINGS:    CT brain:    No hydrocephalus, mass effect, midline shift, acute intracranial   hemorrhage, or brain edema.    Mild to moderate white matter microvascular ischemic disease.    Visualized paranasal sinuses and mastoid air cells are clear.    CTA brain:    Normal flow-related enhancement of the skull base/intracranial internal   carotid arteries.    Normal flow-related enhancement of the bilateral anterior, middle, and   posterior cerebral arteries.    Anterior communicating artery is visualized.    Normal flow-related enhancement of the bilateral intradural vertebral   arteries and the basilar artery.    No flow-limiting stenosis or vascular aneurysm. No AVM.    IMPRESSION:    CT brain:  No hydrocephalus, acute intracranial hemorrhage, mass effect, or brain   edema.    CTA brain:  No flow-limiting stenosis or vascular aneurysm. No AVM.    -MR      < from: MR Head No Cont (07.26.23 @ 16:43) >    ACC: [id number] EXAM:  MR BRAIN   ORDERED BY: RYAN BEAL     PROCEDURE DATE:  07/26/2023          INTERPRETATION:  CLINICAL INDICATION: Dizziness and room spinning      Magnetic resonance imaging of the brain was carried out with transaxial   SPGR, FLAIR, fast spin echo T2 weighted images, axial susceptibility   weighted series, diffusion weighted series and sagittal T1 weighted   series on a 1.5 Janey magnet.    Comparison is made with the prior CT/CTA of 7/25/2023.      Ventricular and sulcal prominence is consistent with age-appropriate   involutional change. Extensive small vessel white matter ischemic changes   are noted. No acute infarcts are seen. There is no old or new hemorrhage.   The sellar and parasellar structures are unremarkable. There is a   retention cyst or polyp in the right maxillary sinus. There has been   previous bilateral lens replacement surgery.        IMPRESSION: Age-appropriate involutional and ischemic gliotic changes. No   acute infarcts or hemorrhage..            STEPHANIE FLOWER MD; Attending Radiologist  This document has been electronically signed. Jul 26 2023  5:10PM        TIANNA SMALL MD; Attending Radiologist  This document has been electronically signed. Jul 25 2023  7:41PM    ASSESSMENT AND PLAN:      seen for dizziness/nausea  likely TIA in the setting of   New onset AF    Continue apixaban  on statin  cardiology follow up  neuro wise stable for dc; management wendy Puentes  neuro follow up as out patient  Pain is accessed and addressed.                
[facility] -- Uriah Grant, Janet, Rj, Daniel Arteaga Savella  Office # [phone]      Follow Up:  New Afib    Subjective/Observations: No events overnight resting comfortably in bed.  No complaints of chest pain, dyspnea, or palpitations reported. No signs of orthopnea or PND.       REVIEW OF SYSTEMS: All other review of systems is negative unless indicated above    PAST MEDICAL & SURGICAL HISTORY:  Hypertension      Bladder disorder      Hyperlipidemia      Osteoarthritis      History of dilatation and curettage  x 3      S/P cataract surgery          MEDICATIONS  (STANDING):  amLODIPine   Tablet 10 milliGRAM(s) Oral daily  apixaban 5 milliGRAM(s) Oral two times a day  atorvastatin 40 milliGRAM(s) Oral at bedtime  metoprolol tartrate 50 milliGRAM(s) Oral two times a day  sodium chloride 0.9%. 1000 milliLiter(s) (60 mL/Hr) IV Continuous <Continuous>    MEDICATIONS  (PRN):  meclizine 25 milliGRAM(s) Oral three times a day PRN Dizziness      Allergies    penicillins (Rash)  Indocin (Rash)    Intolerances        Vital Signs Last 24 Hrs  T(C): 36.9 (27 Jul 2023 04:43), Max: 36.9 (27 Jul 2023 04:43)  T(F): 98.4 (27 Jul 2023 04:43), Max: 98.4 (27 Jul 2023 04:43)  HR: 61 (27 Jul 2023 07:35) (56 - 66)  BP: 125/69 (27 Jul 2023 04:43) (125/69 - 149/78)  BP(mean): --  RR: 16 (27 Jul 2023 04:43) (16 - 17)  SpO2: 96% (27 Jul 2023 07:35) (94% - 98%)    Parameters below as of 27 Jul 2023 07:35  Patient On (Oxygen Delivery Method): room air        I&O's Summary        PHYSICAL EXAM:  TELE: SB  Constitutional: NAD, awake and alert, well-developed  HEENT: Moist Mucous Membranes, Anicteric  Pulmonary: Non-labored, breath sounds are clear bilaterally, No wheezing, crackles or rhonchi  Cardiovascular: Regular, S1 and S2 nl, No murmurs, rubs, gallops or clicks  Gastrointestinal: Bowel Sounds present, soft, nontender.   Lymph: No lymphadenopathy. No peripheral edema.  Skin: No visible rashes or ulcers.  Psych:  Mood & affect appropriate    LABS: All Labs Reviewed:                        12.1   6.79  )-----------( 221      ( 27 Jul 2023 06:40 )             36.5                         12.1   8.34  )-----------( 254      ( 26 Jul 2023 07:00 )             38.1                         12.7   6.51  )-----------( 264      ( 25 Jul 2023 09:00 )             39.7     27 Jul 2023 06:40    140    |  108    |  21     ----------------------------<  87     4.1     |  28     |  0.89   26 Jul 2023 07:00    143    |  112    |  16     ----------------------------<  85     4.0     |  28     |  0.85   25 Jul 2023 09:00    142    |  111    |  31     ----------------------------<  124    4.4     |  26     |  1.20     Ca    8.9        27 Jul 2023 06:40  Ca    8.9        26 Jul 2023 07:00  Ca    9.7        25 Jul 2023 09:00  Phos  2.5       26 Jul 2023 07:00  Mg     2.2       26 Jul 2023 07:00    TPro  7.2    /  Alb  3.3    /  TBili  0.7    /  DBili  x      /  AST  17     /  ALT  22     /  AlkPhos  97     27 Jul 2023 06:40  TPro  8.0    /  Alb  4.3    /  TBili  0.7    /  DBili  x      /  AST  24     /  ALT  26     /  AlkPhos  100    25 Jul 2023 09:00                        
Patient is a 82y old  Female who presents with a chief complaint of new afib (26 Jul 2023 09:55)    INTERVAL HPI/OVERNIGHT EVENTS: Patient was seen and examined. Reports still with some dizziness. no headache at this time. HR at times a debbi on monitor.   I&O's Summary      LABS:                        12.1   8.34  )-----------( 254      ( 26 Jul 2023 07:00 )             38.1     07-26    143  |  112<H>  |  16  ----------------------------<  85  4.0   |  28  |  0.85    Ca    8.9      26 Jul 2023 07:00  Phos  2.5     07-26  Mg     2.2     07-26    TPro  8.0  /  Alb  4.3  /  TBili  0.7  /  DBili  x   /  AST  24  /  ALT  26  /  AlkPhos  100  07-25      Urinalysis Basic - ( 26 Jul 2023 07:00 )    Color: x / Appearance: x / SG: x / pH: x  Gluc: 85 mg/dL / Ketone: x  / Bili: x / Urobili: x   Blood: x / Protein: x / Nitrite: x   Leuk Esterase: x / RBC: x / WBC x   Sq Epi: x / Non Sq Epi: x / Bacteria: x      CAPILLARY BLOOD GLUCOSE            Urinalysis Basic - ( 26 Jul 2023 07:00 )    Color: x / Appearance: x / SG: x / pH: x  Gluc: 85 mg/dL / Ketone: x  / Bili: x / Urobili: x   Blood: x / Protein: x / Nitrite: x   Leuk Esterase: x / RBC: x / WBC x   Sq Epi: x / Non Sq Epi: x / Bacteria: x        MEDICATIONS  (STANDING):  amLODIPine   Tablet 10 milliGRAM(s) Oral daily  apixaban 5 milliGRAM(s) Oral two times a day  atorvastatin 40 milliGRAM(s) Oral at bedtime  metoprolol tartrate 50 milliGRAM(s) Oral two times a day  sodium chloride 0.9%. 1000 milliLiter(s) (60 mL/Hr) IV Continuous <Continuous>    MEDICATIONS  (PRN):  meclizine 25 milliGRAM(s) Oral three times a day PRN Dizziness      REVIEW OF SYSTEMS:  CONSTITUTIONAL: No fever or chills  HEENT:  No headache, no sore throat  RESPIRATORY: No cough, wheezing, or shortness of breath  CARDIOVASCULAR: No chest pain, palpitations  GASTROINTESTINAL: No abdominal pain, nausea, vomiting, or diarrhea  GENITOURINARY: No dysuria, frequency, or hematuria  NEUROLOGICAL: no focal weakness or dizziness  MUSCULOSKELETAL: no myalgias  PSYCH: no recent changes in mood    RADIOLOGY & ADDITIONAL TESTS:    Imaging Personally Reviewed:  [x] YES  [ ] NO    Consultant(s) Notes Reviewed:  [x] YES  [ ] NO    PHYSICAL EXAM:  T(C): 36.4 (07-26-23 @ 05:03), Max: 36.7 (07-25-23 @ 19:35)  HR: 78 (07-26-23 @ 08:50) (53 - 80)  BP: 127/73 (07-26-23 @ 05:03) (108/53 - 146/70)  RR: 17 (07-26-23 @ 05:03) (16 - 18)  SpO2: 94% (07-26-23 @ 07:45) (94% - 98%)    GENERAL: NAD, well-developed, well-groomed  HEENT:  anicteric, moist mucous membranes  CHEST/LUNG:  CTA b/l, no rales, wheezes, or rhonchi  HEART:  irregular   ABDOMEN:  BS+, soft, nontender, nondistended  EXTREMITIES: no edema, cyanosis, or calf tenderness  NERVOUS SYSTEM: answers questions and follows commands appropriately  PSYCH: normal affect    Care Discussed with Consultants/Other Providers [x] YES  [ ] NO

## 2023-07-27 NOTE — DISCHARGE NOTE NURSING/CASE MANAGEMENT/SOCIAL WORK - PATIENT PORTAL LINK FT
You can access the FollowMyHealth Patient Portal offered by [facility] by registering at the following website: [email or web address]. By joining [facility]’s FollowMyHealth portal, you will also be able to view your health information using other applications (apps) compatible with our system.

## 2023-07-27 NOTE — DISCHARGE NOTE PROVIDER - NSDCCPCAREPLAN_GEN_ALL_CORE_FT
PRINCIPAL DISCHARGE DIAGNOSIS  Diagnosis: New onset atrial fibrillation  Assessment and Plan of Treatment: Your metoprolol was increased to 50mg twice a day.   Your losartan was held for now.   Continue eliquis 5mg twice a day.   Outpatient follow up with cardiology in the office next week.      SECONDARY DISCHARGE DIAGNOSES  Diagnosis: Vertigo  Assessment and Plan of Treatment: Continue meclizine as needed.   Your MRI was negative for acute infarct.   Please follow up with neurology in the office in 2-3 weeks.     Not applicable

## 2023-07-27 NOTE — DISCHARGE NOTE PROVIDER - CARE PROVIDER_API CALL
Moshe Kruse  Cardiovascular Disease  [address] [place] [address]  Phone: [phone]  Fax: [phone]  Follow Up Time:     Elisha Canseco  Neurology  [address] [place] [address]  Phone: [phone]  Fax: [phone]  Follow Up Time: 2 weeks

## 2023-08-01 PROBLEM — E78.5 HYPERLIPIDEMIA, UNSPECIFIED: Chronic | Status: ACTIVE | Noted: 2023-07-25

## 2023-08-01 PROBLEM — M19.90 UNSPECIFIED OSTEOARTHRITIS, UNSPECIFIED SITE: Chronic | Status: ACTIVE | Noted: 2023-07-25

## 2023-08-08 DIAGNOSIS — R42 DIZZINESS AND GIDDINESS: ICD-10-CM

## 2023-08-08 RX ORDER — LOSARTAN POTASSIUM 25 MG/1
25 TABLET, FILM COATED ORAL
Qty: 90 | Refills: 3 | Status: DISCONTINUED | COMMUNITY
Start: 1900-01-01 | End: 2023-08-08

## 2023-08-08 RX ORDER — METOPROLOL TARTRATE 25 MG/1
25 TABLET, FILM COATED ORAL
Qty: 180 | Refills: 3 | Status: DISCONTINUED | COMMUNITY
Start: 1900-01-01 | End: 2023-08-08

## 2023-08-09 ENCOUNTER — APPOINTMENT (OUTPATIENT)
Dept: CARDIOLOGY | Facility: CLINIC | Age: 83
End: 2023-08-09
Payer: MEDICARE

## 2023-08-09 VITALS
SYSTOLIC BLOOD PRESSURE: 160 MMHG | WEIGHT: 141 LBS | BODY MASS INDEX: 24.98 KG/M2 | DIASTOLIC BLOOD PRESSURE: 73 MMHG | HEIGHT: 63 IN | HEART RATE: 70 BPM | OXYGEN SATURATION: 99 %

## 2023-08-09 DIAGNOSIS — I34.0 NONRHEUMATIC MITRAL (VALVE) INSUFFICIENCY: ICD-10-CM

## 2023-08-09 PROCEDURE — 93000 ELECTROCARDIOGRAM COMPLETE: CPT

## 2023-08-09 PROCEDURE — 99215 OFFICE O/P EST HI 40 MIN: CPT

## 2023-08-09 RX ORDER — LOTEPREDNOL ETABONATE 10 MG/ML
1 SUSPENSION TOPICAL
Qty: 3 | Refills: 0 | Status: DISCONTINUED | COMMUNITY
Start: 2022-05-11 | End: 2023-08-09

## 2023-08-09 RX ORDER — TROPICAMIDE 10 MG/ML
1 SOLUTION/ DROPS OPHTHALMIC
Qty: 3 | Refills: 0 | Status: DISCONTINUED | COMMUNITY
Start: 2022-05-11 | End: 2023-08-09

## 2023-08-09 RX ORDER — OFLOXACIN 3 MG/ML
0.3 SOLUTION/ DROPS OPHTHALMIC
Qty: 10 | Refills: 0 | Status: DISCONTINUED | COMMUNITY
Start: 2022-05-11 | End: 2023-08-09

## 2023-08-09 RX ORDER — OMEGA-3/DHA/EPA/FISH OIL 1200 MG
1200 CAPSULE ORAL 3 TIMES DAILY
Refills: 0 | Status: DISCONTINUED | COMMUNITY
End: 2023-08-09

## 2023-08-09 RX ORDER — BROMFENAC SODIUM 0.7 MG/ML
0.07 SOLUTION/ DROPS OPHTHALMIC
Qty: 3 | Refills: 0 | Status: DISCONTINUED | COMMUNITY
Start: 2022-05-11 | End: 2023-08-09

## 2023-08-09 NOTE — CARDIOLOGY SUMMARY
[de-identified] : 8/9/23 -sinus rhythm at a rate of 65 bpm.  1 supraventricular premature contraction.  Left axis deviation.  Nonspecific poor R wave progression.  Nonspecific diminished voltage in leads V4-V6.  Nonspecific ST-T wave abnormalities.

## 2023-08-09 NOTE — PHYSICAL EXAM
[Well Developed] : well developed [No Acute Distress] : no acute distress [Normal Conjunctiva] : normal conjunctiva [Normal S1, S2] : normal S1, S2 [No Gallop] : no gallop [Murmur] : murmur [Clear Lung Fields] : clear lung fields [No Respiratory Distress] : no respiratory distress  [Soft] : abdomen soft [Non Tender] : non-tender [Normal Gait] : normal gait [No Edema] : no edema [No Rash] : no rash [Moves all extremities] : moves all extremities [Alert and Oriented] : alert and oriented [de-identified] : No JVD is appreciated at a 45 degree angle [de-identified] : I/VI systolic ejection murmur

## 2023-08-09 NOTE — HISTORY OF PRESENT ILLNESS
[FreeTextEntry1] : Mrs. Katherine Zhang presented to the office today for follow-up cardiac evaluation.  I last evaluated the patient in the office on 3/14/2023.  The patient is an 82-year-old female with a history of hypertension, a dyslipidemia, moderate left ventricular diastolic dysfunction, mild mitral regurgitation, moderate tricuspid regurgitation, nonspecific electrocardiographic abnormalities, supraventricular ectopy, mild carotid atherosclerosis, mild peripheral arterial disease, gout, glaucoma, and COVID-19 viral infection (tested + 8/23/2022, associated with a mild viral syndrome).  She is status post right cataract surgery on 7/14/2022 and right eye cataract surgery on 9/22/2022.  Most recently, she has been diagnosed as having paroxysmal atrial fibrillation.  The patient awakened on the morning of 7/25/2023, and while lying in bed, describes having experienced vertigo, persisting for approximately 1 minute.  She became concerned, and drove herself to the emergency department at [facility].  Upon arrival, she was asymptomatic, however, her electrocardiogram revealed newly-discovered atrial fibrillation.  Dosage of metoprolol was increased from 25 mg twice daily to 50 mg twice daily and anticoagulation in the form of Eliquis twice daily was initiated.  She was evaluated by a neurologist, and had an MRI scan of the brain performed which was unrevealing for an acute CVA.  The patient was discharged to home on 7/27/2023.  Since coming home from the hospital, the patient has been doing well from a cardiac symptomatic standpoint.  Specifically, she does not describe having experienced chest discomfort or dyspnea on exertion in association with her activities.  She has not noted orthopnea, paroxysmal nocturnal dyspnea, or lower extremity edema.  She has not experienced any episodes of palpitations, presyncope or syncope.  She has not experienced recurrence of vertigo.  As far as risk factors for coronary artery disease are concerned, the patient has a history of hypertension and a dyslipidemia.  She does not have a history of diabetes.  She discontinued cigarette smoking in 1969, describing herself as an occasional smoker prior to that time.  She does not have a known immediate family history of premature coronary artery disease.  Laboratory studies performed on 6/27/2022 revealed cholesterol 144, triglycerides 63, HDL 54, and calculated LDL 78.  The liver chemistries were normal.  The BUN and creatinine were 30 and 1.1, respectively.  The potassium level was 4.4.  The glucose level was 101 and the hemoglobin A1c level was 5.3%.  The hemoglobin and hematocrit were 13.0 and 39.6, respectively.  The TSH level was 1.84.  The vitamin D level was 35.6.  A 24-hour ambulatory blood pressure monitoring study performed on 9/27/2022 revealed the average reading to be 125/75, with 19% of systolic readings being in the elevated range and 7% of diastolic readings being in the elevated range.  Exercise stress testing performed on 10/10/2017 revealed the patient to exhibit mildly reduced exercise tolerance for her age with an exaggerated heart rate response to exercise, compatible with decreased cardiovascular conditioning.  The study was negative for the inducement of cardiac symptoms or electrocardiographic evidence of myocardial ischemia.  Frequent supraventricular ectopy was noted at baseline, diminishing in frequency during exercise, and returning to the baseline frequency during the recovery period.  Echocardiography most recently performed on 9/27/2022 revealed the left atrium to be markedly dilated.  The remainder of the cardiac chambers were normal in dimension.  Left ventricular wall thickness and wall motion were normal.  Left ventricular systolic function was normal, with an estimated ejection fraction of 60 to 65%.  Moderate diastolic dysfunction was demonstrated.  Mild mitral regurgitation and mild to moderate tricuspid regurgitation were demonstrated, with an estimated right ventricular systolic pressure of 35 mmHg.  Carotid artery Doppler testing performed on 9/8/2020 revealed mild plaque formation involving the bulbar regions bilaterally.  No significant stenoses were demonstrated.

## 2023-08-09 NOTE — DISCUSSION/SUMMARY
[FreeTextEntry1] : Mrs. Zhang awakened on the morning of 7/25/2023 with vertigo while lying in bed, which resolved after approximately 1 month.  She drove herself to the emergency room at [facility], asymptomatic at that point, and was noted to be exhibiting newly-discovered atrial fibrillation on her electrocardiogram (asymptomatic).  Her dosage of metoprolol was increased from 25 mg daily to 50 mg twice daily and anticoagulation was initiated in the form of Eliquis 5 mg twice daily.  She was evaluated in consultation by a neurologist, and an MRI scan of the brain was reportedly negative for evidence of an acute CVA.  She was discharged to home on 7/27/2023.  The patient been doing well from a cardiac symptomatic standpoint since the aforementioned brief hospitalization.  Specifically, she has not experienced recurrence of vertigo..  She does not describe having experienced any signs or symptoms to suggest the development of an anginal syndrome, congestive heart failure, or a hemodynamically-compromising arrhythmia.  Her cardiac examination today is remarkable for a systolic ejection murmur, compatible with age-related valvular calcification, and unchanged from her previous visit with me.  Her oxygen saturation is 99% on room air.  Her blood pressure reading today is mildly elevated.  Her electrocardiogram today reveals sinus rhythm with 1 supraventricular premature contraction, left axis deviation, nonspecific poor R wave progression, nonspecific diminished voltage in leads V4-V6, and nonspecific ST-T wave abnormalities, essentially unchanged from her previous office tracing, with the exception of the isolated supraventricular premature contraction exhibited on the present tracing.  I have discussed the implications of paroxysmal atrial fibrillation at length with the patient today, including rate-control vs. rhythm-control strategies, as well as the role of anticoagulation in reducing the risk of stroke associated with this arrhythmia.  As the patient was asymptomatic of the atrial fibrillation, I have recommended a rate-control strategy at this time.  Toward this end, I have instructed her to continue metoprolol 50 mg twice daily.  In addition, I have directed her to continue Eliquis 5 mg twice daily to potentially reduce the risk of stroke associated with paroxysmal atrial fibrillation.  The risks of anticoagulation therapy and the potential for adverse bleeding events was discussed with the patient, and I have instructed her to call me if she should experience suspected bleeding.  The patient is exhibiting a mildly elevated blood pressure reading today.  I have reviewed the findings of the 24-hour ambulatory blood pressure monitoring study of 9/27/2022 in detail with the patient today. I have instructed the patient to continue her antihypertensive therapy as presently prescribed for the time being, and her blood pressure will be followed.  I have reviewed the findings of the exercise stress test of 10/10/2017, the echocardiogram of 9/27/2022, and the carotid artery Doppler study of 9/8/2020 in detail with the patient today.  I am referring the patient for follow-up echocardiography at this point to reassess cardiac structural integrity, left ventricular function, valvular morphology and function, and the pulmonary artery systolic pressure.  The patient is going to make arrangements to have this study performed through our office, and I will telephone her to discuss the findings, once the study has been completed.  I have reviewed the findings of the blood test report of 6/27/2022 in detail with the patient today, and I have instructed her to continue Crestor 5 mg daily for the time being.  The patient anticipates having follow-up blood testing performed at the time of her upcoming visit with Dr. Munoz on 8/25/2023 for reassessment.  The importance of proper dietary habits, proper weight maintenance, and regular exercise as tolerated was emphasized to the patient today.  I have asked the patient to call me if she should have any questions or problems pertaining to these matters, and especially if she should experience any concerning symptoms.  I have otherwise asked her to return to the office for follow-up cardiac evaluation and blood pressure reassessment in 3 months, provided she remains clinically stable in the interim, and the findings on the upcoming echocardiogram do not warrant sooner evaluation and/or treatment. [EKG obtained to assist in diagnosis and management of assessed problem(s)] : EKG obtained to assist in diagnosis and management of assessed problem(s)

## 2023-08-25 ENCOUNTER — APPOINTMENT (OUTPATIENT)
Dept: INTERNAL MEDICINE | Facility: CLINIC | Age: 83
End: 2023-08-25
Payer: COMMERCIAL

## 2023-08-25 VITALS
HEART RATE: 67 BPM | BODY MASS INDEX: 24.27 KG/M2 | RESPIRATION RATE: 16 BRPM | OXYGEN SATURATION: 98 % | TEMPERATURE: 97.5 F | DIASTOLIC BLOOD PRESSURE: 68 MMHG | WEIGHT: 137 LBS | HEIGHT: 63 IN | SYSTOLIC BLOOD PRESSURE: 126 MMHG

## 2023-08-25 DIAGNOSIS — N18.30 CHRONIC KIDNEY DISEASE, STAGE 3 UNSPECIFIED: ICD-10-CM

## 2023-08-25 DIAGNOSIS — Z00.00 ENCOUNTER FOR GENERAL ADULT MEDICAL EXAMINATION W/OUT ABNORMAL FINDINGS: ICD-10-CM

## 2023-08-25 PROCEDURE — G0439: CPT

## 2023-08-25 PROCEDURE — G0009: CPT

## 2023-08-25 PROCEDURE — 90732 PPSV23 VACC 2 YRS+ SUBQ/IM: CPT

## 2023-08-25 PROCEDURE — 99397 PER PM REEVAL EST PAT 65+ YR: CPT

## 2023-08-25 RX ORDER — APIXABAN 5 MG/1
5 TABLET, FILM COATED ORAL
Qty: 10 | Refills: 3 | Status: ACTIVE | COMMUNITY
Start: 1900-01-01 | End: 1900-01-01

## 2023-08-25 NOTE — HEALTH RISK ASSESSMENT
[Yes] : Yes [Monthly or less (1 pt)] : Monthly or less (1 point) [1 or 2 (0 pts)] : 1 or 2 (0 points) [Little interest or pleasure doing things] : 1) Little interest or pleasure doing things [Feeling down, depressed, or hopeless] : 2) Feeling down, depressed, or hopeless [0] : 2) Feeling down, depressed, or hopeless: Not at all (0) [PHQ-2 Negative - No further assessment needed] : PHQ-2 Negative - No further assessment needed [[id number]] : 0 [Patient reported mammogram was normal] : Patient reported mammogram was normal [Fully functional (bathing, dressing, toileting, transferring, walking, feeding)] : Fully functional (bathing, dressing, toileting, transferring, walking, feeding) [Fully functional (using the telephone, shopping, preparing meals, housekeeping, doing laundry, using] : Fully functional and needs no help or supervision to perform IADLs (using the telephone, shopping, preparing meals, housekeeping, doing laundry, using transportation, managing medications and managing finances) [MammogramDate] : 07/23 [Never] : Never

## 2023-08-25 NOTE — HISTORY OF PRESENT ILLNESS
[FreeTextEntry1] : cpe [de-identified] : Pt has uti symptoms for a week. She was in [place] when it started. Has h/o dropped bladder.   Has A fib and on eliquis. She went to ER for dizziness and dx with A fib.   Not willing to go on medication for osteoporosis.

## 2023-08-26 LAB
25(OH)D3 SERPL-MCNC: 29.7 NG/ML
ALBUMIN SERPL ELPH-MCNC: 4.3 G/DL
ALP BLD-CCNC: 106 U/L
ALT SERPL-CCNC: 15 U/L
ANION GAP SERPL CALC-SCNC: 12 MMOL/L
APPEARANCE: ABNORMAL
AST SERPL-CCNC: 21 U/L
BACTERIA: ABNORMAL /HPF
BILIRUB SERPL-MCNC: 0.9 MG/DL
BILIRUBIN URINE: NEGATIVE
BLOOD URINE: ABNORMAL
BUN SERPL-MCNC: 24 MG/DL
CALCIUM SERPL-MCNC: 9.9 MG/DL
CAST: 2 /LPF
CHLORIDE SERPL-SCNC: 103 MMOL/L
CHOLEST SERPL-MCNC: 146 MG/DL
CO2 SERPL-SCNC: 24 MMOL/L
COLOR: YELLOW
CREAT SERPL-MCNC: 1.12 MG/DL
EGFR: 49 ML/MIN/1.73M2
EPITHELIAL CELLS: 2 /HPF
ESTIMATED AVERAGE GLUCOSE: 103 MG/DL
FOLATE SERPL-MCNC: 11.1 NG/ML
GLUCOSE QUALITATIVE U: NEGATIVE MG/DL
GLUCOSE SERPL-MCNC: 88 MG/DL
HBA1C MFR BLD HPLC: 5.2 %
HDLC SERPL-MCNC: 52 MG/DL
KETONES URINE: NEGATIVE MG/DL
LDLC SERPL CALC-MCNC: 80 MG/DL
LEUKOCYTE ESTERASE URINE: ABNORMAL
MICROSCOPIC-UA: NORMAL
NITRITE URINE: NEGATIVE
NONHDLC SERPL-MCNC: 94 MG/DL
PH URINE: 6
POTASSIUM SERPL-SCNC: 4.3 MMOL/L
PROT SERPL-MCNC: 7.1 G/DL
PROTEIN URINE: 300 MG/DL
RED BLOOD CELLS URINE: 9 /HPF
SODIUM SERPL-SCNC: 139 MMOL/L
SPECIFIC GRAVITY URINE: 1.01
TRIGL SERPL-MCNC: 76 MG/DL
TSH SERPL-ACNC: 2.21 UIU/ML
URATE SERPL-MCNC: 7.1 MG/DL
UROBILINOGEN URINE: 1 MG/DL
VIT B12 SERPL-MCNC: 513 PG/ML
WHITE BLOOD CELLS URINE: 704 /HPF

## 2023-08-28 LAB — BACTERIA UR CULT: ABNORMAL

## 2023-09-08 ENCOUNTER — APPOINTMENT (OUTPATIENT)
Dept: CARDIOLOGY | Facility: CLINIC | Age: 83
End: 2023-09-08
Payer: MEDICARE

## 2023-09-08 PROCEDURE — 93306 TTE W/DOPPLER COMPLETE: CPT

## 2023-09-11 DIAGNOSIS — R82.90 UNSPECIFIED ABNORMAL FINDINGS IN URINE: ICD-10-CM

## 2023-09-19 ENCOUNTER — RX RENEWAL (OUTPATIENT)
Age: 83
End: 2023-09-19

## 2023-09-19 RX ORDER — AMLODIPINE BESYLATE 10 MG/1
10 TABLET ORAL
Qty: 90 | Refills: 2 | Status: ACTIVE | COMMUNITY
Start: 2017-05-09

## 2023-09-19 RX ORDER — METOPROLOL TARTRATE 50 MG/1
50 TABLET, FILM COATED ORAL
Qty: 180 | Refills: 2 | Status: ACTIVE | COMMUNITY

## 2023-10-05 NOTE — DISCHARGE NOTE NURSING/CASE MANAGEMENT/SOCIAL WORK - NSDPLANG ASIS_GEN_ALL_CORE
No
no breast tenderness L/no breast tenderness R/no breast lump L/no breast lump R/no nipple discharge L/no nipple discharge R

## 2023-11-06 ENCOUNTER — APPOINTMENT (OUTPATIENT)
Dept: CARDIOLOGY | Facility: CLINIC | Age: 83
End: 2023-11-06
Payer: MEDICARE

## 2023-11-06 VITALS
HEIGHT: 63 IN | WEIGHT: 138 LBS | OXYGEN SATURATION: 99 % | DIASTOLIC BLOOD PRESSURE: 73 MMHG | SYSTOLIC BLOOD PRESSURE: 162 MMHG | HEART RATE: 66 BPM | BODY MASS INDEX: 24.45 KG/M2

## 2023-11-06 PROCEDURE — 93000 ELECTROCARDIOGRAM COMPLETE: CPT

## 2023-11-06 PROCEDURE — 99215 OFFICE O/P EST HI 40 MIN: CPT

## 2023-11-15 ENCOUNTER — NON-APPOINTMENT (OUTPATIENT)
Age: 83
End: 2023-11-15

## 2023-11-17 ENCOUNTER — APPOINTMENT (OUTPATIENT)
Dept: CARDIOLOGY | Facility: CLINIC | Age: 83
End: 2023-11-17
Payer: MEDICARE

## 2023-11-17 PROCEDURE — 78452 HT MUSCLE IMAGE SPECT MULT: CPT

## 2023-11-17 PROCEDURE — 93015 CV STRESS TEST SUPVJ I&R: CPT

## 2023-11-17 PROCEDURE — A9500: CPT

## 2023-11-18 LAB
APPEARANCE: ABNORMAL
BACTERIA: ABNORMAL /HPF
BILIRUBIN URINE: ABNORMAL
BLOOD URINE: ABNORMAL
CAST: 2 /LPF
COLOR: ABNORMAL
EPITHELIAL CELLS: 3 /HPF
GLUCOSE QUALITATIVE U: NEGATIVE MG/DL
KETONES URINE: NEGATIVE MG/DL
LEUKOCYTE ESTERASE URINE: ABNORMAL
MICROSCOPIC-UA: NORMAL
NITRITE URINE: POSITIVE
PH URINE: 6
PROTEIN URINE: 300 MG/DL
RED BLOOD CELLS URINE: >1900 /HPF
REVIEW: NORMAL
SPECIFIC GRAVITY URINE: 1.02
UROBILINOGEN URINE: 0.2 MG/DL
WHITE BLOOD CELLS URINE: >998 /HPF

## 2023-11-19 LAB — BACTERIA UR CULT: NORMAL

## 2023-12-27 PROBLEM — Z83.511 FAMILY HISTORY OF GLAUCOMA: Status: ACTIVE | Noted: 2023-12-27

## 2023-12-27 PROBLEM — Z83.3 FAMILY HISTORY OF DIABETES MELLITUS: Status: ACTIVE | Noted: 2023-12-27

## 2023-12-27 PROBLEM — Z86.69 HISTORY OF GLAUCOMA: Status: RESOLVED | Noted: 2023-12-27 | Resolved: 2023-12-27

## 2023-12-27 PROBLEM — Z86.79 HISTORY OF CARDIAC DISORDER: Status: RESOLVED | Noted: 2023-12-27 | Resolved: 2023-12-27

## 2023-12-27 PROBLEM — Z86.79 HISTORY OF HYPERTENSION: Status: RESOLVED | Noted: 2023-12-27 | Resolved: 2023-12-27

## 2023-12-27 PROBLEM — Z63.4 WIDOWED: Status: ACTIVE | Noted: 2023-12-27

## 2023-12-27 PROBLEM — N39.0 RECURRENT UTI: Status: ACTIVE | Noted: 2023-12-27

## 2023-12-27 PROBLEM — N81.9 FEMALE GENITAL PROLAPSE, UNSPECIFIED TYPE: Status: ACTIVE | Noted: 2023-12-27

## 2023-12-27 PROBLEM — Z82.49 FAMILY HISTORY OF HYPERTENSION: Status: ACTIVE | Noted: 2023-12-27

## 2023-12-27 PROBLEM — R32 URINARY INCONTINENCE IN FEMALE: Status: ACTIVE | Noted: 2023-12-27

## 2023-12-27 PROBLEM — R35.0 URINARY FREQUENCY: Status: ACTIVE | Noted: 2023-12-27

## 2023-12-27 PROBLEM — Z86.39 HISTORY OF HIGH CHOLESTEROL: Status: RESOLVED | Noted: 2023-12-27 | Resolved: 2023-12-27

## 2023-12-27 PROBLEM — Z80.3 FAMILY HISTORY OF MALIGNANT NEOPLASM OF BREAST: Status: ACTIVE | Noted: 2023-12-27

## 2023-12-27 RX ORDER — MECLIZINE HYDROCHLORIDE 25 MG/1
25 TABLET ORAL 3 TIMES DAILY
Refills: 0 | Status: DISCONTINUED | COMMUNITY
End: 2023-12-27

## 2023-12-31 PROBLEM — Z23 NEED FOR VACCINATION WITH 13-POLYVALENT PNEUMOCOCCAL CONJUGATE VACCINE: Status: ACTIVE | Noted: 2018-03-23

## 2024-01-02 ENCOUNTER — APPOINTMENT (OUTPATIENT)
Dept: UROGYNECOLOGY | Facility: CLINIC | Age: 84
End: 2024-01-02
Payer: MEDICARE

## 2024-01-02 VITALS
DIASTOLIC BLOOD PRESSURE: 80 MMHG | HEART RATE: 73 BPM | SYSTOLIC BLOOD PRESSURE: 154 MMHG | HEIGHT: 63 IN | RESPIRATION RATE: 16 BRPM | BODY MASS INDEX: 24.45 KG/M2 | OXYGEN SATURATION: 98 % | WEIGHT: 138 LBS

## 2024-01-02 DIAGNOSIS — Z83.3 FAMILY HISTORY OF DIABETES MELLITUS: ICD-10-CM

## 2024-01-02 DIAGNOSIS — Z83.511 FAMILY HISTORY OF GLAUCOMA: ICD-10-CM

## 2024-01-02 DIAGNOSIS — Z86.79 PERSONAL HISTORY OF OTHER DISEASES OF THE CIRCULATORY SYSTEM: ICD-10-CM

## 2024-01-02 DIAGNOSIS — Z80.3 FAMILY HISTORY OF MALIGNANT NEOPLASM OF BREAST: ICD-10-CM

## 2024-01-02 DIAGNOSIS — Z46.89 ENCOUNTER FOR FITTING AND ADJUSTMENT OF OTHER SPECIFIED DEVICES: ICD-10-CM

## 2024-01-02 DIAGNOSIS — Z86.39 PERSONAL HISTORY OF OTHER ENDOCRINE, NUTRITIONAL AND METABOLIC DISEASE: ICD-10-CM

## 2024-01-02 DIAGNOSIS — N39.0 URINARY TRACT INFECTION, SITE NOT SPECIFIED: ICD-10-CM

## 2024-01-02 DIAGNOSIS — Z86.69 PERSONAL HISTORY OF OTHER DISEASES OF THE NERVOUS SYSTEM AND SENSE ORGANS: ICD-10-CM

## 2024-01-02 DIAGNOSIS — Z82.49 FAMILY HISTORY OF ISCHEMIC HEART DISEASE AND OTHER DISEASES OF THE CIRCULATORY SYSTEM: ICD-10-CM

## 2024-01-02 DIAGNOSIS — R35.0 FREQUENCY OF MICTURITION: ICD-10-CM

## 2024-01-02 DIAGNOSIS — R32 UNSPECIFIED URINARY INCONTINENCE: ICD-10-CM

## 2024-01-02 DIAGNOSIS — Z63.4 DISAPPEARANCE AND DEATH OF FAMILY MEMBER: ICD-10-CM

## 2024-01-02 DIAGNOSIS — N81.9 FEMALE GENITAL PROLAPSE, UNSPECIFIED: ICD-10-CM

## 2024-01-02 LAB
BILIRUB UR QL STRIP: NEGATIVE
CLARITY UR: NORMAL
COLLECTION METHOD: NORMAL
GLUCOSE UR-MCNC: NEGATIVE
HCG UR QL: 0.2 EU/DL
HGB UR QL STRIP.AUTO: NORMAL
KETONES UR-MCNC: NEGATIVE
LEUKOCYTE ESTERASE UR QL STRIP: NORMAL
NITRITE UR QL STRIP: POSITIVE
PH UR STRIP: 6
PROT UR STRIP-MCNC: NORMAL
SP GR UR STRIP: 1.02

## 2024-01-02 PROCEDURE — 81003 URINALYSIS AUTO W/O SCOPE: CPT | Mod: QW

## 2024-01-02 PROCEDURE — 51701 INSERT BLADDER CATHETER: CPT | Mod: 59

## 2024-01-02 PROCEDURE — 99204 OFFICE O/P NEW MOD 45 MIN: CPT | Mod: 25

## 2024-01-02 SDOH — SOCIAL STABILITY - SOCIAL INSECURITY: DISSAPEARANCE AND DEATH OF FAMILY MEMBER: Z63.4

## 2024-01-02 NOTE — PHYSICAL EXAM
[Chaperone Present] : A chaperone was present in the examining room during all aspects of the physical examination [No Acute Distress] : in no acute distress [Well developed] : well developed [Well Nourished] : ~L well nourished [Oriented x3] : oriented to person, place, and time [No Edema] : ~T edema was not present [Warm and Dry] : was warm and dry to touch [Cough] : no cough [Tenderness] : ~T no ~M abdominal tenderness observed [Distended] : not distended [None] : no CVA tenderness [Labia Majora] : were normal [Labia Minora] : were normal [Bartholin's Gland] : both Bartholin's glands were normal  [Atrophy] : atrophy [No Bleeding] : there was no active vaginal bleeding [2] : 2 [Aa ____] : Aa [unfilled] [Ba ____] : Ba [unfilled] [C ____] : C [unfilled] [GH ____] : GH [unfilled] [PB ____] : PB [unfilled] [TVL ____] : TVL  [unfilled] [Ap ____] : Ap [unfilled] [Bp ____] : Bp [unfilled] [D ____] : D [unfilled] [] : II [Normal] : normal [Soft] :  the cervix was soft [Post Void Residual ____ml] : post void residual was [unfilled] ml [Exam Deferred] : was deferred [FreeTextEntry3] : empty supine cst neg, urethral caruncle/atrophy [de-identified] : checked standing as well - pt doesn't feel as though the POP is down as it often is, especially when she needs to urinate [de-identified] : no apprec mass

## 2024-01-02 NOTE — HISTORY OF PRESENT ILLNESS
[FreeTextEntry1] : PREM is a 83 year female who presents for pelvic organ prolapse and urinary incontinence. 24 hour VD: 8v2 large L, intake coffee and water 38 oz in total. Pelvic floor issues since  when she was diagnosed with POP and had a pessary placed at that time and was cleaned per Gyn q3 months. Used pessary until 2016 but got fed up with care and spotting at times which was always light. Now feels bulge and protrusion of prolapse worsened this year and is bothersome this year. In , reports at least 2 UTIs based on dysuria and +urine cultures or dips per physician's office. No vaginal bleeding now. UI without hematuria. UI with urgency which requires multiple pad changes daily since Aug 2023. No leakage with cough sneeze or positional changes. No flank pain. Occasionally cannot empty bladder however unsure if incomplete emptying vs her bladder is empty.  [facility] review labs /[id number]: BUN/cr 24/1.2, GFR 49, AST/ALT wnl, hgba1c wnl; UA more recently contaminated likely with concurrent neg UCx   POB: 3  GYN: LMP , Last pap , No estrogen use PMH: Glaucoma (she is unsure which type but reports recent pressure was OK at opho appt), Back problem, Bronchitis, Heart disease, Pneumonia, HTN, High Cholesterol, AFib PSH: Appendectomy, D&C, Lipoma excision, Left breast biopsy, Bilateral cataracts  Meds: Eliquis, Metoprolol, Rosuvastatin, Amlodipine  Allx: PCN, Indocin Prior smoker   Daytime frequency: Yes  Nocturia: Yes  Urinary urgency: Yes  Leakage of urine with urgency: Yes  Leakage of urine with coughing sneezing laughing: No  Incontinence pad use: Sensation of incomplete bladder emptying: No History of frequent urinary tract infections: Yes History of hematuria: No Previous treatment: Pessary - Vaginal symptoms: Denies pelvic pain. Reports bulge symptoms.  Bowel symptoms: Denies loss of stool or rectal symptoms.

## 2024-01-02 NOTE — ASSESSMENT
[FreeTextEntry1] : 84 yo with POP, UI. On exam, CST neg,  ml cloudy - UA, UCx sent to eval further for UTI and/or MH, atrophy noted, and a mild stage II cystocele on POPQ only however I suspect this doesn't represent the true degree of her POP. The patient has pelvic organ prolapse. Management options including observation, kegels with or without PT, biofeedback, pessary, and surgery were reviewed. Pessary care was reviewed. Surg options obliterative vs reconstructive, major vs minor, abd / robotic vs vaginal, with or without hysterectomy, with or without graft use discussed. She is interested in surg as she has failed prior pessary use - RTO for cysto with POPQ exam, UDS, TVUS. Surg planning based on repeat exam. She is on Eliquis for Afib. All ques answered.  Plan: [] cysto + POPQ exam [] UDS [] TVUS

## 2024-01-02 NOTE — OB HISTORY
[Vaginal ___] : [unfilled] vaginal delivery(s) [Definite ___ (Date)] : the last menstrual period was [unfilled] [Last Pap Smear ___] : date of last pap smear was on [unfilled] [Abnormal Pap Smear] : normal pap smear [Taking Estrogens] : is not taking estrogen replacement [Sexually Active] : is not sexually active [FreeTextEntry1] : largest baby 6lb 12oz

## 2024-01-02 NOTE — PROCEDURE
[Straight Catheterization] : insertion of a straight catheter [Urinary Tract Infection] : a urinary tract infection [Urinary Retention] : urinary retention [Urgent Incontinence] : urgent incontinence [Urinary Frequency] : urinary frequency [Patient] : the patient [___ Fr Straight Tip] : a [unfilled] in [place] straight tip catheter [None] : there were no complications with the catheter insertion [Clear] : clear [Culture] : culture [Urinalysis] : urinalysis [No Complications] : no complications [Tolerated Well] : the patient tolerated the procedure well [Post procedure instructions and information given] : Post procedure instructions and information were given and reviewed with patient. [1] : 1 [FreeTextEntry1] : cathed to obtain pvr and uncontam specimen

## 2024-01-02 NOTE — ADDENDUM
[FreeTextEntry1] : This note was written by Monie Babcock, acting as the  for Dr. Adams. This note accurately reflects the work and decisions made by Dr. Adams.

## 2024-01-02 NOTE — REASON FOR VISIT
[Initial Visit ___] : an initial visit for [unfilled] [Questionnaire Received] : Patient questionnaire received [Urinary Incontinence] : urinary incontinence [Urine Frequency] : urine frequency [Urinary Urgency] : urinary urgency [Pelvic Organ Prolapse] : pelvic organ prolapse [Nocturia] : nocturia [Pessary] : pessary

## 2024-01-03 LAB
APPEARANCE: ABNORMAL
BACTERIA: ABNORMAL /HPF
BILIRUBIN URINE: NEGATIVE
BLOOD URINE: ABNORMAL
CAST: 1 /LPF
COLOR: YELLOW
EPITHELIAL CELLS: 0 /HPF
GLUCOSE QUALITATIVE U: NEGATIVE MG/DL
KETONES URINE: NEGATIVE MG/DL
LEUKOCYTE ESTERASE URINE: ABNORMAL
MICROSCOPIC-UA: NORMAL
NITRITE URINE: POSITIVE
PH URINE: 6
PROTEIN URINE: 300 MG/DL
RED BLOOD CELLS URINE: 704 /HPF
REVIEW: NORMAL
SPECIFIC GRAVITY URINE: 1.02
UROBILINOGEN URINE: 0.2 MG/DL
WBC CLUMPS: PRESENT
WHITE BLOOD CELLS URINE: >998 /HPF

## 2024-01-30 ENCOUNTER — APPOINTMENT (OUTPATIENT)
Dept: UROGYNECOLOGY | Facility: CLINIC | Age: 84
End: 2024-01-30
Payer: MEDICARE

## 2024-01-30 VITALS
BODY MASS INDEX: 24.45 KG/M2 | WEIGHT: 138 LBS | HEART RATE: 59 BPM | TEMPERATURE: 97.6 F | HEIGHT: 63 IN | SYSTOLIC BLOOD PRESSURE: 179 MMHG | OXYGEN SATURATION: 99 % | DIASTOLIC BLOOD PRESSURE: 84 MMHG

## 2024-01-30 LAB
BILIRUB UR QL STRIP: NEGATIVE
CLARITY UR: CLEAR
COLLECTION METHOD: NORMAL
GLUCOSE UR-MCNC: NEGATIVE
HCG UR QL: 0.2 EU/DL
HGB UR QL STRIP.AUTO: NORMAL
KETONES UR-MCNC: NEGATIVE
LEUKOCYTE ESTERASE UR QL STRIP: NORMAL
NITRITE UR QL STRIP: NEGATIVE
PH UR STRIP: 7
PROT UR STRIP-MCNC: 100
SP GR UR STRIP: 1.02

## 2024-01-30 PROCEDURE — 81003 URINALYSIS AUTO W/O SCOPE: CPT | Mod: QW

## 2024-01-30 PROCEDURE — 51797 INTRAABDOMINAL PRESSURE TEST: CPT

## 2024-01-30 PROCEDURE — 51784 ANAL/URINARY MUSCLE STUDY: CPT

## 2024-01-30 PROCEDURE — 51741 ELECTRO-UROFLOWMETRY FIRST: CPT

## 2024-01-30 PROCEDURE — 51729 CYSTOMETROGRAM W/VP&UP: CPT

## 2024-03-03 ENCOUNTER — RX RENEWAL (OUTPATIENT)
Age: 84
End: 2024-03-03

## 2024-03-03 RX ORDER — ROSUVASTATIN CALCIUM 5 MG/1
5 TABLET, FILM COATED ORAL DAILY
Qty: 90 | Refills: 3 | Status: ACTIVE | COMMUNITY
Start: 2017-06-30 | End: 1900-01-01

## 2024-03-05 ENCOUNTER — APPOINTMENT (OUTPATIENT)
Dept: UROGYNECOLOGY | Facility: CLINIC | Age: 84
End: 2024-03-05
Payer: MEDICARE

## 2024-03-05 VITALS
DIASTOLIC BLOOD PRESSURE: 88 MMHG | OXYGEN SATURATION: 99 % | BODY MASS INDEX: 24.8 KG/M2 | SYSTOLIC BLOOD PRESSURE: 172 MMHG | HEART RATE: 65 BPM | RESPIRATION RATE: 16 BRPM | WEIGHT: 140 LBS | HEIGHT: 63 IN

## 2024-03-05 PROCEDURE — 52000 CYSTOURETHROSCOPY: CPT

## 2024-03-06 LAB — URINE CYTOLOGY: NORMAL

## 2024-03-08 ENCOUNTER — NON-APPOINTMENT (OUTPATIENT)
Age: 84
End: 2024-03-08

## 2024-04-17 ENCOUNTER — APPOINTMENT (OUTPATIENT)
Dept: UROLOGY | Facility: CLINIC | Age: 84
End: 2024-04-17
Payer: MEDICARE

## 2024-04-17 ENCOUNTER — NON-APPOINTMENT (OUTPATIENT)
Age: 84
End: 2024-04-17

## 2024-04-17 VITALS
HEART RATE: 60 BPM | SYSTOLIC BLOOD PRESSURE: 179 MMHG | WEIGHT: 140 LBS | HEIGHT: 63 IN | RESPIRATION RATE: 16 BRPM | DIASTOLIC BLOOD PRESSURE: 80 MMHG | BODY MASS INDEX: 24.8 KG/M2 | OXYGEN SATURATION: 99 %

## 2024-04-17 DIAGNOSIS — R39.15 URGENCY OF URINATION: ICD-10-CM

## 2024-04-17 DIAGNOSIS — Z86.79 PERSONAL HISTORY OF OTHER DISEASES OF THE CIRCULATORY SYSTEM: ICD-10-CM

## 2024-04-17 DIAGNOSIS — N32.9 BLADDER DISORDER, UNSPECIFIED: ICD-10-CM

## 2024-04-17 DIAGNOSIS — N81.10 CYSTOCELE, UNSPECIFIED: ICD-10-CM

## 2024-04-17 PROCEDURE — 99204 OFFICE O/P NEW MOD 45 MIN: CPT

## 2024-04-17 PROCEDURE — G2211 COMPLEX E/M VISIT ADD ON: CPT

## 2024-04-17 PROCEDURE — 99459 PELVIC EXAMINATION: CPT

## 2024-04-17 NOTE — HISTORY OF PRESENT ILLNESS
[None] : None [FreeTextEntry1] : PREM RABAGO  83 year F presents for ? cystoscopy Patient history of pessary use since  -. then stopped for vaginal bleeding. Replaced since  and was doing well and then fell out.   Had cystoscopy in preparation for surgery and recurernt UTI. Had to abort because of pain.  3 utis in .  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 5 cc of 2% Lidocaine Gel was administered intraurethrally. A flexible cystoscope was used to visualize the urethra and bladder.    Additional Procedural Findings/Comments: no foreign body, no stone; patient cannot tolerate fill plus due to discomfort, unable to sufficiently perform diagnostic cysto. some area of flat patchy erythema noted without protrusion, without discrete mass or lesion noted however she was extremely uncomfortable reporting pain more than urge to void or fullness.    2024 Note by DR. Nevarez.      PREM RABAGO is being seen for an initial visit for genital prolapse and urinary incontinence. Patient questionnaire received.   Primary Chief Complaint: urinary incontinence.   Secondary Chief Complaint: urine frequency, urinary urgency, nocturia.   Tertiary Chief Complaint: pelvic organ prolapse.   Previous Treatments pessary.  History of Present Illness     PREM is a 83 year female who presents for pelvic organ prolapse and urinary incontinence. 24 hour VD: 8v2 large L, intake coffee and water 38 oz in total. Pelvic floor issues since  when she was diagnosed with POP and had a pessary placed at that time and was cleaned per Gyn q3 months. Used pessary until  but got fed up with care and spotting at times which was always light. Now feels bulge and protrusion of prolapse worsened this year and is bothersome this year. In , reports at least 2 UTIs based on dysuria and +urine cultures or dips per physician's office. No vaginal bleeding now. UI without hematuria. UI with urgency which requires multiple pad changes daily since Aug 2023. No leakage with cough sneeze or positional changes. No flank pain. Occasionally cannot empty bladder however unsure if incomplete emptying vs her bladder is empty.  [facility] review labs /[id number]: BUN/cr 24/1.2, GFR 49, AST/ALT wnl, hgba1c wnl; UA more recently contaminated likely with concurrent neg UCx  POB: 3  GYN: LMP , Last pap , No estrogen use PMH: Glaucoma (she is unsure which type but reports recent pressure was OK at opho appt), Back problem, Bronchitis, Heart disease, Pneumonia, HTN, High Cholesterol, AFib PSH: Appendectomy, D&C, Lipoma excision, Left breast biopsy, Bilateral cataracts Meds: Eliquis, Metoprolol, Rosuvastatin, Amlodipine Allx: PCN, Indocin Prior smoker  Daytime frequency: Yes Nocturia: Yes Urinary urgency: Yes Leakage of urine with urgency: Yes Leakage of urine with coughing sneezing laughing: No Incontinence pad use: Sensation of incomplete bladder emptying: No History of frequent urinary tract infections: Yes History of hematuria: No Previous treatment: Pessary - Vaginal symptoms: Denies pelvic pain. Reports bulge symptoms. Bowel symptoms: Denies loss of stool or rectal symptoms.  [Urinary Incontinence] : no urinary incontinence

## 2024-04-17 NOTE — PHYSICAL EXAM
[Normal Appearance] : normal appearance [Well Groomed] : well groomed [General Appearance - In No Acute Distress] : no acute distress [Edema] : no peripheral edema [Respiration, Rhythm And Depth] : normal respiratory rhythm and effort [Exaggerated Use Of Accessory Muscles For Inspiration] : no accessory muscle use [Abdomen Soft] : soft [Abdomen Tenderness] : non-tender [Costovertebral Angle Tenderness] : no ~M costovertebral angle tenderness [Urinary Bladder Findings] : the bladder was normal on palpation [Normal Station and Gait] : the gait and station were normal for the patient's age [] : no rash [No Focal Deficits] : no focal deficits [Oriented To Time, Place, And Person] : oriented to person, place, and time [Affect] : the affect was normal [Mood] : the mood was normal [No Palpable Adenopathy] : no palpable adenopathy [de-identified] : pessary replaced no lesions. no issues

## 2024-04-17 NOTE — ASSESSMENT
[FreeTextEntry1] : Patient with bladde lesion noted on office cystoscopy Treatment options discussed. Patient share decision making. Cystoscopy bladder biopsy on sedation. Procedure explained. Risks including bladder / urethral injury, dysuria, UTI, bleeding, passage of clots.   Nothing to eat after midnight day before Stop any aspirin / anti inflammatories 7 days before if ok by primary or cardiologist Stop blood thinners 5 days before  if ok by primary or cardiologist. The office will call for your presurgical testing.  The Hospital will call you the business day before to inform you of the time and date. Wear comfortable clothes Hydrate the day before surgery.  Patient was interviewed and examined with chaperone present. Name of Chaperone: Soraida Chilel

## 2024-05-02 ENCOUNTER — APPOINTMENT (OUTPATIENT)
Dept: INTERNAL MEDICINE | Facility: CLINIC | Age: 84
End: 2024-05-02
Payer: MEDICARE

## 2024-05-02 VITALS
WEIGHT: 139 LBS | HEART RATE: 45 BPM | RESPIRATION RATE: 16 BRPM | OXYGEN SATURATION: 98 % | TEMPERATURE: 99.2 F | HEIGHT: 63 IN | BODY MASS INDEX: 24.63 KG/M2 | SYSTOLIC BLOOD PRESSURE: 164 MMHG | DIASTOLIC BLOOD PRESSURE: 64 MMHG

## 2024-05-02 VITALS — SYSTOLIC BLOOD PRESSURE: 146 MMHG | DIASTOLIC BLOOD PRESSURE: 68 MMHG

## 2024-05-02 DIAGNOSIS — Z01.818 ENCOUNTER FOR OTHER PREPROCEDURAL EXAMINATION: ICD-10-CM

## 2024-05-02 DIAGNOSIS — M10.9 GOUT, UNSPECIFIED: ICD-10-CM

## 2024-05-02 DIAGNOSIS — M81.0 AGE-RELATED OSTEOPOROSIS W/OUT CURRENT PATHOLOGICAL FRACTURE: ICD-10-CM

## 2024-05-02 DIAGNOSIS — E78.5 HYPERLIPIDEMIA, UNSPECIFIED: ICD-10-CM

## 2024-05-02 PROCEDURE — 99214 OFFICE O/P EST MOD 30 MIN: CPT

## 2024-05-02 PROCEDURE — G2211 COMPLEX E/M VISIT ADD ON: CPT

## 2024-05-08 ENCOUNTER — APPOINTMENT (OUTPATIENT)
Dept: CARDIOLOGY | Facility: CLINIC | Age: 84
End: 2024-05-08
Payer: MEDICARE

## 2024-05-08 VITALS
HEIGHT: 63 IN | HEART RATE: 69 BPM | DIASTOLIC BLOOD PRESSURE: 70 MMHG | OXYGEN SATURATION: 97 % | SYSTOLIC BLOOD PRESSURE: 138 MMHG | WEIGHT: 137 LBS | BODY MASS INDEX: 24.27 KG/M2

## 2024-05-08 DIAGNOSIS — R06.09 OTHER FORMS OF DYSPNEA: ICD-10-CM

## 2024-05-08 DIAGNOSIS — Z01.810 ENCOUNTER FOR PREPROCEDURAL CARDIOVASCULAR EXAMINATION: ICD-10-CM

## 2024-05-08 DIAGNOSIS — I48.0 PAROXYSMAL ATRIAL FIBRILLATION: ICD-10-CM

## 2024-05-08 DIAGNOSIS — I10 ESSENTIAL (PRIMARY) HYPERTENSION: ICD-10-CM

## 2024-05-08 PROCEDURE — G2211 COMPLEX E/M VISIT ADD ON: CPT

## 2024-05-08 PROCEDURE — 99215 OFFICE O/P EST HI 40 MIN: CPT

## 2024-05-08 PROCEDURE — 93000 ELECTROCARDIOGRAM COMPLETE: CPT

## 2024-05-08 RX ORDER — NITROFURANTOIN (MONOHYDRATE/MACROCRYSTALS) 25; 75 MG/1; MG/1
100 CAPSULE ORAL
Qty: 14 | Refills: 0 | Status: DISCONTINUED | COMMUNITY
Start: 2023-08-25 | End: 2024-05-08

## 2024-05-08 RX ORDER — ROSUVASTATIN CALCIUM 5 MG/1
TABLET, FILM COATED ORAL
Refills: 0 | Status: DISCONTINUED | COMMUNITY
End: 2024-05-08

## 2024-05-08 RX ORDER — APIXABAN 5 MG/1
TABLET, FILM COATED ORAL
Refills: 0 | Status: DISCONTINUED | COMMUNITY
End: 2024-05-08

## 2024-05-08 RX ORDER — FOSFOMYCIN TROMETHAMINE 3 G/1
3 POWDER ORAL
Qty: 3 | Refills: 0 | Status: DISCONTINUED | COMMUNITY
Start: 2024-03-11 | End: 2024-05-08

## 2024-05-08 RX ORDER — ROSUVASTATIN CALCIUM 5 MG/1
5 TABLET, FILM COATED ORAL
Qty: 90 | Refills: 3 | Status: DISCONTINUED | COMMUNITY
End: 2024-05-08

## 2024-05-08 RX ORDER — AMLODIPINE BESYLATE 5 MG/1
TABLET ORAL
Refills: 0 | Status: DISCONTINUED | COMMUNITY
End: 2024-05-08

## 2024-05-08 RX ORDER — METOPROLOL TARTRATE 75 MG/1
TABLET, FILM COATED ORAL
Refills: 0 | Status: DISCONTINUED | COMMUNITY
End: 2024-05-08

## 2024-05-08 NOTE — DISCUSSION/SUMMARY
[FreeTextEntry1] : Mrs. Zhang has been stable from a cardiac symptomatic standpoint since her previous visit here on 11/6/2023.  Specifically, she has noted a similar degree of dyspnea on exertion in association with walking at a faster pace than usual, however, not in association with her other routine activities.  Her cardiac examination today is remarkable for a systolic ejection murmur, compatible with age-related valvular calcification, and unchanged from her previous visit with me.  Her oxygen saturation is 97% on room air.  Her blood pressure reading today is slightly elevated, although acceptable.  Her electrocardiogram today reveals sinus rhythm with left axis deviation, nonspecific poor R wave progression, nonspecific diminished voltage in leads V4-V6, and nonspecific ST-T wave abnormalities, essentially unchanged from her previous office tracing, allowing for lead placement variation.  I have pointed out to the patient that her systolic blood pressure reading today is slightly elevated.  As her blood pressure reading at the time of her previous visit with me was normal, I have instructed her to continue her antihypertensive therapy as prescribed for the time being, and her blood pressure will be followed.  I have discussed the implications of paroxysmal atrial fibrillation at length with the patient today, including rate-control vs. rhythm-control strategies, as well as the role of anticoagulation in reducing the risk of stroke associated with this arrhythmia.  As the patient was asymptomatic of the atrial fibrillation, I have recommended a rate-control strategy at this time.  Toward this end, I have instructed her to continue metoprolol 50 mg twice daily.  In addition, I have directed her to continue Eliquis 5 mg twice daily to potentially reduce the risk of stroke associated with paroxysmal atrial fibrillation.  The risks of anticoagulation therapy and the potential for adverse bleeding events was discussed with the patient, and I have instructed her to call me if she should experience suspected bleeding.  I have reviewed the findings of the pharmacological nuclear stress study of 11/17/2023, the echocardiogram of 9/8/2023, and the carotid artery Doppler study of 9/8/2020 in detail with the patient today.  I have reviewed the findings of the blood test report of 8/25/2023 in detail with the patient today, and I have instructed her to continue Crestor 5 mg daily for the time being.  The importance of proper dietary habits, proper weight maintenance, and regular exercise as tolerated was emphasized to the patient today.  I find no cardiac contraindication to the patient undergoing the proposed urological procedure (cystoscopy under anesthesia) as scheduled, under routine perioperative monitoring conditions.  She has been instructed to hold Eliquis for the 2 days prior to the procedure.  The Eliquis should then be resumed following the procedure, as soon as deemed to be reasonably safe from the perspective of the urologist.  I have asked the patient to call me if she should have any questions or problems pertaining to these matters, and especially if she should experience any concerning symptoms.  I have otherwise asked her to return to the office for follow-up cardiac evaluation and blood pressure reassessment in 6 months, provided she remains clinically stable in the interim. [EKG obtained to assist in diagnosis and management of assessed problem(s)] : EKG obtained to assist in diagnosis and management of assessed problem(s)

## 2024-05-08 NOTE — HISTORY OF PRESENT ILLNESS
[FreeTextEntry1] : Mrs. Katherine Zhang presented to the office today for follow-up cardiac evaluation, and specifically, for cardiac clearance to undergo cystoscopy under anesthesia.  The procedure is scheduled to be performed on 5/21/2024 at [facility] by Dr. Rhiannon Westfall.  I last evaluated the patient in the office on 11/6/2023.  The patient is an 83-year-old female with a history of hypertension, a dyslipidemia, moderate left ventricular diastolic dysfunction, mild mitral regurgitation, moderate tricuspid regurgitation, nonspecific electrocardiographic abnormalities, supraventricular ectopy, mild carotid atherosclerosis, mild peripheral arterial disease, gout, glaucoma, osteoporosis, uterine prolapse (pessary), left eye cataract surgery on 7/14/2022 and right eye cataract surgery on 9/22/2022.  Most recently, she has been diagnosed as having paroxysmal atrial fibrillation.  At the time of the patient's previous visit on 11/6/2023, the patient reported having noted dyspnea on exertion when walking at a faster pace than usual.  Follow-up pharmacological nuclear stress testing performed on 11/17/2023 revealed normal left ventricular myocardial perfusion and systolic function.  The patient has been stable from a cardiac symptomatic standpoint since her previous visit here on 11/6/2023.  Specifically, she has noted a similar degree of dyspnea on exertion in association with walking at a faster pace than usual, however, not in association with her routine activities.  She has not experienced any symptoms of chest discomfort in association with her activities.  She has not noted orthopnea, paroxysmal nocturnal dyspnea, or lower extremity edema.  She has not experienced any episodes of palpitations, presyncope or syncope.  The patient awakened on the morning of 7/25/2023, and while lying in bed, describes having experienced vertigo, persisting for approximately 1 minute.  She became concerned, and drove herself to the emergency department at [facility].  Upon arrival, she was asymptomatic, however, her electrocardiogram revealed newly-discovered atrial fibrillation.  Her dosage of metoprolol was increased from 25 mg twice daily to 50 mg twice daily and anticoagulation in the form of Eliquis twice daily was initiated.  She was evaluated by a neurologist, and had an MRI scan of the brain performed which was unrevealing for an acute CVA.  The patient was discharged to home on 7/27/2023.  She was noted to be exhibiting sinus rhythm on her electrocardiogram at the time of a follow-up visit with me on 8/9/2023.  As far as risk factors for coronary artery disease are concerned, the patient has a history of hypertension and a dyslipidemia.  She does not have a history of diabetes.  She discontinued cigarette smoking in 1969, describing herself as an occasional smoker prior to that time.  She does not have a known immediate family history of premature coronary artery disease.  Laboratory studies performed on 8/25/2023 (on Crestor 5 mg daily) revealed cholesterol 146, triglycerides 76, HDL 52, and calculated LDL 80.  The liver chemistries were normal.  The BUN and creatinine were 24 and 1.1, respectively.  The potassium level was 4.3.  The glucose level was 88 and the hemoglobin A1c level was 5.2%.  The hemoglobin and hematocrit were 11.9 and 37.2, respectively.  The uric acid level was 7.1.  The TSH level was 2.21.  The vitamin D level was 29.7.  A 24-hour ambulatory blood pressure monitoring study performed on 9/27/2022 revealed the average reading to be 125/75, with 19% of systolic readings being in the elevated range and 7% of diastolic readings being in the elevated range.  Pharmacological nuclear stress testing performed on 11/6/2023 was negative for the inducement of cardiac symptoms, electrocardiographic evidence of myocardial ischemia, or significant arrhythmias.  The cardiac imaging portion of the study revealed normal left ventricular myocardial perfusion and systolic function (EF 76%).  Echocardiography most recently performed on 9/8/2023 revealed the left atrium to be markedly dilated.  The remainder of the cardiac chambers were normal in dimension.  Left ventricular wall thickness and wall motion were normal, with an estimated ejection fraction of 60%.  Left ventricular diastolic function was indeterminate.  Mild mitral regurgitation, mild pulmonic regurgitation, and mild to moderate tricuspid regurgitation were demonstrated, without evidence of pulmonary hypertension.  Carotid artery Doppler testing performed on 9/8/2020 revealed mild plaque formation involving the bulbar regions bilaterally.  No significant stenoses were demonstrated.

## 2024-05-08 NOTE — CARDIOLOGY SUMMARY
[de-identified] : 5/8/24 -sinus rhythm at a rate of 66 bpm.  Left axis deviation.  Nonspecific poor R wave progression.  Nonspecific diminished voltage in leads V4-V6.  Nonspecific ST-T wave abnormalities.

## 2024-05-08 NOTE — PHYSICAL EXAM
[Well Developed] : well developed [No Acute Distress] : no acute distress [Normal Conjunctiva] : normal conjunctiva [Normal S1, S2] : normal S1, S2 [No Gallop] : no gallop [Murmur] : murmur [Clear Lung Fields] : clear lung fields [No Respiratory Distress] : no respiratory distress  [Soft] : abdomen soft [Non Tender] : non-tender [Normal Gait] : normal gait [No Edema] : no edema [No Rash] : no rash [Moves all extremities] : moves all extremities [Alert and Oriented] : alert and oriented [de-identified] : No JVD is appreciated at a 45 degree angle [de-identified] : I/VI systolic ejection murmur

## 2024-05-10 ENCOUNTER — NON-APPOINTMENT (OUTPATIENT)
Age: 84
End: 2024-05-10

## 2024-05-10 DIAGNOSIS — R30.0 DYSURIA: ICD-10-CM

## 2024-05-10 RX ORDER — SULFAMETHOXAZOLE AND TRIMETHOPRIM 800; 160 MG/1; MG/1
800-160 TABLET ORAL TWICE DAILY
Qty: 10 | Refills: 0 | Status: ACTIVE | COMMUNITY
Start: 2024-01-08 | End: 1900-01-01

## 2024-05-10 RX ORDER — PHENAZOPYRIDINE 100 MG/1
100 TABLET, FILM COATED ORAL 3 TIMES DAILY
Qty: 15 | Refills: 1 | Status: ACTIVE | COMMUNITY
Start: 2024-05-10 | End: 1900-01-01

## 2024-05-14 ENCOUNTER — OUTPATIENT (OUTPATIENT)
Dept: OUTPATIENT SERVICES | Facility: HOSPITAL | Age: 84
LOS: 1 days | End: 2024-05-14
Payer: MEDICARE

## 2024-05-14 VITALS
DIASTOLIC BLOOD PRESSURE: 59 MMHG | HEART RATE: 68 BPM | RESPIRATION RATE: 16 BRPM | TEMPERATURE: 98 F | OXYGEN SATURATION: 98 % | SYSTOLIC BLOOD PRESSURE: 107 MMHG | WEIGHT: 136.03 LBS

## 2024-05-14 DIAGNOSIS — Z98.890 OTHER SPECIFIED POSTPROCEDURAL STATES: Chronic | ICD-10-CM

## 2024-05-14 DIAGNOSIS — Z01.818 ENCOUNTER FOR OTHER PREPROCEDURAL EXAMINATION: ICD-10-CM

## 2024-05-14 DIAGNOSIS — I48.91 UNSPECIFIED ATRIAL FIBRILLATION: ICD-10-CM

## 2024-05-14 DIAGNOSIS — Z98.89 OTHER SPECIFIED POSTPROCEDURAL STATES: Chronic | ICD-10-CM

## 2024-05-14 DIAGNOSIS — N81.10 CYSTOCELE, UNSPECIFIED: ICD-10-CM

## 2024-05-14 DIAGNOSIS — Z98.49 CATARACT EXTRACTION STATUS, UNSPECIFIED EYE: Chronic | ICD-10-CM

## 2024-05-14 LAB
ANION GAP SERPL CALC-SCNC: 6 MMOL/L — SIGNIFICANT CHANGE UP (ref 5–17)
BUN SERPL-MCNC: 32 MG/DL — HIGH (ref 7–23)
CALCIUM SERPL-MCNC: 10.4 MG/DL — HIGH (ref 8.5–10.1)
CHLORIDE SERPL-SCNC: 105 MMOL/L — SIGNIFICANT CHANGE UP (ref 96–108)
CO2 SERPL-SCNC: 26 MMOL/L — SIGNIFICANT CHANGE UP (ref 22–31)
CREAT SERPL-MCNC: 1.8 MG/DL — HIGH (ref 0.5–1.3)
EGFR: 27 ML/MIN/1.73M2 — LOW
GLUCOSE SERPL-MCNC: 103 MG/DL — HIGH (ref 70–99)
HCT VFR BLD CALC: 37.6 % — SIGNIFICANT CHANGE UP (ref 34.5–45)
HGB BLD-MCNC: 12 G/DL — SIGNIFICANT CHANGE UP (ref 11.5–15.5)
MCHC RBC-ENTMCNC: 28.7 PG — SIGNIFICANT CHANGE UP (ref 27–34)
MCHC RBC-ENTMCNC: 31.9 GM/DL — LOW (ref 32–36)
MCV RBC AUTO: 90 FL — SIGNIFICANT CHANGE UP (ref 80–100)
NRBC # BLD: 0 /100 WBCS — SIGNIFICANT CHANGE UP (ref 0–0)
PLATELET # BLD AUTO: 395 K/UL — SIGNIFICANT CHANGE UP (ref 150–400)
POTASSIUM SERPL-MCNC: 4.7 MMOL/L — SIGNIFICANT CHANGE UP (ref 3.5–5.3)
POTASSIUM SERPL-SCNC: 4.7 MMOL/L — SIGNIFICANT CHANGE UP (ref 3.5–5.3)
RBC # BLD: 4.18 M/UL — SIGNIFICANT CHANGE UP (ref 3.8–5.2)
RBC # FLD: 13.4 % — SIGNIFICANT CHANGE UP (ref 10.3–14.5)
SODIUM SERPL-SCNC: 137 MMOL/L — SIGNIFICANT CHANGE UP (ref 135–145)
WBC # BLD: 9.69 K/UL — SIGNIFICANT CHANGE UP (ref 3.8–10.5)
WBC # FLD AUTO: 9.69 K/UL — SIGNIFICANT CHANGE UP (ref 3.8–10.5)

## 2024-05-14 RX ORDER — AMLODIPINE BESYLATE 2.5 MG/1
1 TABLET ORAL
Refills: 0 | DISCHARGE

## 2024-05-14 RX ORDER — ROSUVASTATIN CALCIUM 5 MG/1
1 TABLET ORAL
Refills: 0 | DISCHARGE

## 2024-05-14 NOTE — H&P PST ADULT - ATTENDING COMMENTS
Risks benefits alternatives explained. Patient understands and agrees.  Including bleeding, infection, burning with urination, urethral and bladder injury.

## 2024-05-14 NOTE — H&P PST ADULT - NSICDXPASTMEDICALHX_GEN_ALL_CORE_FT
PAST MEDICAL HISTORY:  Hyperlipidemia     Hypertension     Osteoarthritis      PAST MEDICAL HISTORY:  Afib     Cystocele, unspecified     Hyperlipidemia     Hypertension     Osteoarthritis

## 2024-05-14 NOTE — H&P PST ADULT - NSICDXPASTSURGICALHX_GEN_ALL_CORE_FT
PAST SURGICAL HISTORY:  History of dilatation and curettage x 3    S/P cataract surgery     S/P excision of lipoma      PAST SURGICAL HISTORY:  History of dilatation and curettage x 3    S/P cataract surgery     S/P colonoscopy     S/P excision of lipoma

## 2024-05-14 NOTE — H&P PST ADULT - HISTORY OF PRESENT ILLNESS
84 y/o female with PMH of Afib and HTN here for PST. Pt with history of pelvic prolapse and last pessary use was in 2016. Pt with recurrent UTI's. Pt denies dysuria but reports to hematuria. Pt with current UTI and is taking Bactrim BID as prescribed by surgeon. Pt also noted to have bladder lesion on office cystoscopy. Pt denies abdominal and pelvic pain. Pt electing for cystoscopy, bladder biopsy, fulguration on 5/21/24.

## 2024-05-14 NOTE — H&P PST ADULT - PROBLEM SELECTOR PLAN 3
Medical clearance needed as per surgeon and received in chart. CBC, Basic panel, UA and Urine culture ordered. EKG from 5/8/24. Pre-op instructions given and pt verbalized understanding. Medical clearance needed as per surgeon and received in chart. CBC, Basic panel, UA and Urine culture ordered. EKG from 5/8/24 in chart. Pre-op instructions given and pt verbalized understanding. Medical clearance needed as per surgeon and received in chart. CBC, Basic panel, UA and Urine culture ordered. EKG from 5/8/24 in chart. Pre-op instructions given and pt verbalized understanding.    Patient seen and examined today. No change from previous History and physical.

## 2024-05-14 NOTE — H&P PST ADULT - PROBLEM SELECTOR PLAN 1
Cardiac clearance needed and received in chart. Pt to hold Eliquis 2 days before surgery. Pt verbalized understanding.

## 2024-05-15 LAB
APPEARANCE UR: ABNORMAL
BACTERIA # UR AUTO: ABNORMAL /HPF
BILIRUB UR-MCNC: NEGATIVE — SIGNIFICANT CHANGE UP
COLOR SPEC: YELLOW — SIGNIFICANT CHANGE UP
DIFF PNL FLD: ABNORMAL
EPI CELLS # UR: SIGNIFICANT CHANGE UP
GLUCOSE UR QL: NEGATIVE MG/DL — SIGNIFICANT CHANGE UP
KETONES UR-MCNC: NEGATIVE MG/DL — SIGNIFICANT CHANGE UP
LEUKOCYTE ESTERASE UR-ACNC: ABNORMAL
NITRITE UR-MCNC: NEGATIVE — SIGNIFICANT CHANGE UP
PH UR: 7 — SIGNIFICANT CHANGE UP (ref 5–8)
PROT UR-MCNC: SIGNIFICANT CHANGE UP MG/DL
RBC CASTS # UR COMP ASSIST: 20 /HPF — HIGH (ref 0–4)
SP GR SPEC: 1.01 — SIGNIFICANT CHANGE UP (ref 1–1.03)
UROBILINOGEN FLD QL: 0.2 MG/DL — SIGNIFICANT CHANGE UP (ref 0.2–1)
WBC UR QL: 1 /HPF — SIGNIFICANT CHANGE UP (ref 0–5)

## 2024-05-15 PROCEDURE — G0463: CPT

## 2024-05-15 PROCEDURE — 87077 CULTURE AEROBIC IDENTIFY: CPT

## 2024-05-15 PROCEDURE — 36415 COLL VENOUS BLD VENIPUNCTURE: CPT

## 2024-05-15 PROCEDURE — 87086 URINE CULTURE/COLONY COUNT: CPT

## 2024-05-15 PROCEDURE — 81001 URINALYSIS AUTO W/SCOPE: CPT

## 2024-05-15 PROCEDURE — 80048 BASIC METABOLIC PNL TOTAL CA: CPT

## 2024-05-15 PROCEDURE — 87186 SC STD MICRODIL/AGAR DIL: CPT

## 2024-05-15 PROCEDURE — 85027 COMPLETE CBC AUTOMATED: CPT

## 2024-05-16 PROBLEM — I48.91 UNSPECIFIED ATRIAL FIBRILLATION: Chronic | Status: ACTIVE | Noted: 2024-05-14

## 2024-05-16 PROBLEM — N81.10 CYSTOCELE, UNSPECIFIED: Chronic | Status: ACTIVE | Noted: 2024-05-14

## 2024-05-16 RX ORDER — SULFAMETHOXAZOLE AND TRIMETHOPRIM 800; 160 MG/1; MG/1
800-160 TABLET ORAL
Qty: 10 | Refills: 1 | Status: ACTIVE | COMMUNITY
Start: 2022-12-07 | End: 1900-01-01

## 2024-05-18 LAB
-  AMPICILLIN: SIGNIFICANT CHANGE UP
-  CIPROFLOXACIN: SIGNIFICANT CHANGE UP
-  LEVOFLOXACIN: SIGNIFICANT CHANGE UP
-  NITROFURANTOIN: SIGNIFICANT CHANGE UP
-  TETRACYCLINE: SIGNIFICANT CHANGE UP
-  VANCOMYCIN: SIGNIFICANT CHANGE UP
CULTURE RESULTS: ABNORMAL
METHOD TYPE: SIGNIFICANT CHANGE UP
ORGANISM # SPEC MICROSCOPIC CNT: ABNORMAL
ORGANISM # SPEC MICROSCOPIC CNT: SIGNIFICANT CHANGE UP
SPECIMEN SOURCE: SIGNIFICANT CHANGE UP

## 2024-05-20 RX ORDER — CIPROFLOXACIN HYDROCHLORIDE 500 MG/1
500 TABLET, FILM COATED ORAL
Qty: 20 | Refills: 0 | Status: ACTIVE | COMMUNITY
Start: 2024-05-20 | End: 1900-01-01

## 2024-05-21 ENCOUNTER — APPOINTMENT (OUTPATIENT)
Dept: UROLOGY | Facility: HOSPITAL | Age: 84
End: 2024-05-21

## 2024-06-04 ENCOUNTER — APPOINTMENT (OUTPATIENT)
Dept: UROGYNECOLOGY | Facility: CLINIC | Age: 84
End: 2024-06-04
Payer: MEDICARE

## 2024-06-04 PROCEDURE — 99214 OFFICE O/P EST MOD 30 MIN: CPT

## 2024-06-04 NOTE — HISTORY OF PRESENT ILLNESS
[FreeTextEntry1] : 82 yo with POP, UI. On initial exam, CST was neg, PVR was 100 ml, atrophy noted, and a mild stage II cystocele on POPQ. Last visit in March a RS3 pessary was placed and diagnostic cysto was incomplete.  Cysto today to eval LUTS. She is on Eliquis. She was planning for cysto with Urology which is sched for the OR this week 24. Two pessary attempts have failed as they fell out within days of placement in March and April. Currently, she denies vaginal bleeding or vaginal pain. She has bulge symptoms. She denies dysuria, UI, gross hematuria, or incomplete bladder emptying. Desires surg correction of POP and declines nonsurg management such as PT or continued pessary fitting attempts. She reports she has trouble swallowing at times as she feels she has extra tissue however denies issues breathing, waking up in sleep, gasping.  POB: 3  GYN: LMP , Last pap , No estrogen use PMH: Glaucoma (she is unsure which type but reports recent pressure was OK at opho appt), Back problem, Bronchitis, Heart disease, Pneumonia, HTN, High Cholesterol, AFib PSH: Appendectomy, D&C, Lipoma excision, Left breast biopsy, Bilateral cataracts Meds: Eliquis, Metoprolol, Rosuvastatin, Amlodipine Allx: PCN, Indocin Prior smoker  UDS: PVR normal, low capacity due to DO with leakage, no GSUI TVUS: ut 6 x 4 x 2.4 cm, 1 small stable posterior subcent fibr, EE 2 mm, ovs not visualized, no FF

## 2024-06-04 NOTE — ASSESSMENT
[FreeTextEntry1] : Cystocele.  Obliterative vs reconstructive surgery discussed, minor vs major procedures discussed. Abdominal versus vaginal routes of surgery were reviewed. Abdominal surgery via robotic laparoscopy vs laparotomy discussed. Surgery with or without hysterectomy discussed. Surgery with or without graft use discussed. Efficacies, risks, and benefits reviewed.   Abdominally, a supracervical hysterectomy plus sacral colpopexy was discussed. Vaginally, a total hysterectomy plus uterosacral ligament suspension or sacrospinous fixation was discussed. A BARBARA versus total hysterectomy was discussed. With BARBARA, risk of cervical cancer in the future and need for routine pap smear surveillance was discussed. Benefits of BARBARA including decreased risk of mesh exposure was discussed. Bilateral salpingectomy was discussed to decrease the future risk of ovarian cancer. The benefit of oophorectomy to decrease the future risk of ovarian cancer or need for future ovarian surgery was discussed. The protective benefits of retaining ovaries for bone and cardiac health as well as decreased all-cause mortality rate was discussed.   The risks and benefits of surgery were discussed and included: risks of general anesthesia, MI, stroke, cardiopulmonary arrest, infection, abscess formation, bleeding, hematoma formation, hemorrhage, transfusion, blood clot formation, fistula formation, rejection, dyspareunia, chronic pain, neuropathy, damage to surrounding structures including but not limited to bowel/ureters/bladder/rectum/nerves/vessels, leakage of urine, voiding dysfunction, OAB, urinary retention, procedure failure, recurrence, need for further surgery, and going home with a catheter. With the use of mesh, risk of mesh erosion or exposure discussed. The FDA warning on transvaginally placed mesh for prolapse correction versus abdominally placed mesh or transvaginally placed mesh for midurethral sling was reviewed. Website for the FDA provided for information as desired. A pelvic exam under anesthesia was discussed and consented to as well. Perioperative care, anesthesia, NPO prior to the surgery, and postop precautions were reviewed. The presence of learners in the OR was discussed. All questions were answered. She understood and would like to proceed. Consent was signed and witnessed in the office.  Plan: [x] consented for EUA / APR / cystoscopy / other indicated procedures, however this is pending this week's cystoscopy results with Urology [] med clearance - discuss throat symptoms, and discuss with anesthesia

## 2024-06-05 ENCOUNTER — TRANSCRIPTION ENCOUNTER (OUTPATIENT)
Age: 84
End: 2024-06-05

## 2024-06-05 RX ORDER — SODIUM CHLORIDE 9 MG/ML
1000 INJECTION, SOLUTION INTRAVENOUS
Refills: 0 | Status: DISCONTINUED | OUTPATIENT
Start: 2024-06-06 | End: 2024-06-20

## 2024-06-06 ENCOUNTER — TRANSCRIPTION ENCOUNTER (OUTPATIENT)
Age: 84
End: 2024-06-06

## 2024-06-06 ENCOUNTER — OUTPATIENT (OUTPATIENT)
Dept: OUTPATIENT SERVICES | Facility: HOSPITAL | Age: 84
LOS: 1 days | End: 2024-06-06
Payer: MEDICARE

## 2024-06-06 ENCOUNTER — RESULT REVIEW (OUTPATIENT)
Age: 84
End: 2024-06-06

## 2024-06-06 ENCOUNTER — APPOINTMENT (OUTPATIENT)
Dept: UROLOGY | Facility: HOSPITAL | Age: 84
End: 2024-06-06

## 2024-06-06 VITALS
SYSTOLIC BLOOD PRESSURE: 140 MMHG | HEART RATE: 69 BPM | RESPIRATION RATE: 18 BRPM | DIASTOLIC BLOOD PRESSURE: 69 MMHG | OXYGEN SATURATION: 96 % | TEMPERATURE: 97 F

## 2024-06-06 VITALS
WEIGHT: 136.03 LBS | HEART RATE: 64 BPM | RESPIRATION RATE: 14 BRPM | SYSTOLIC BLOOD PRESSURE: 156 MMHG | HEIGHT: 63 IN | OXYGEN SATURATION: 97 % | DIASTOLIC BLOOD PRESSURE: 78 MMHG | TEMPERATURE: 97 F

## 2024-06-06 DIAGNOSIS — Z98.89 OTHER SPECIFIED POSTPROCEDURAL STATES: Chronic | ICD-10-CM

## 2024-06-06 DIAGNOSIS — N81.10 CYSTOCELE, UNSPECIFIED: ICD-10-CM

## 2024-06-06 DIAGNOSIS — Z98.890 OTHER SPECIFIED POSTPROCEDURAL STATES: Chronic | ICD-10-CM

## 2024-06-06 DIAGNOSIS — Z98.49 CATARACT EXTRACTION STATUS, UNSPECIFIED EYE: Chronic | ICD-10-CM

## 2024-06-06 PROCEDURE — ZZZZZ: CPT

## 2024-06-06 PROCEDURE — 88305 TISSUE EXAM BY PATHOLOGIST: CPT

## 2024-06-06 PROCEDURE — 88305 TISSUE EXAM BY PATHOLOGIST: CPT | Mod: 26

## 2024-06-06 PROCEDURE — 52204 CYSTOSCOPY W/BIOPSY(S): CPT

## 2024-06-06 RX ORDER — HYDROMORPHONE HYDROCHLORIDE 2 MG/ML
0.25 INJECTION INTRAMUSCULAR; INTRAVENOUS; SUBCUTANEOUS
Refills: 0 | Status: DISCONTINUED | OUTPATIENT
Start: 2024-06-06 | End: 2024-06-06

## 2024-06-06 RX ORDER — ONDANSETRON 8 MG/1
4 TABLET, FILM COATED ORAL ONCE
Refills: 0 | Status: DISCONTINUED | OUTPATIENT
Start: 2024-06-06 | End: 2024-06-06

## 2024-06-06 RX ORDER — TRAMADOL HYDROCHLORIDE 50 MG/1
25 TABLET ORAL ONCE
Refills: 0 | Status: DISCONTINUED | OUTPATIENT
Start: 2024-06-06 | End: 2024-06-06

## 2024-06-06 RX ADMIN — SODIUM CHLORIDE 50 MILLILITER(S): 9 INJECTION, SOLUTION INTRAVENOUS at 07:04

## 2024-06-06 NOTE — BRIEF OPERATIVE NOTE - ANESTHESIOLOGIST NAME
1/22/2020         RE: Ashli Rogers  [address]  [facility] [address]        Dear Colleague,    Thank you for referring your patient, Ashli Rogers, to the [facility]. Please see a copy of my visit note below.        [facility] Procedure Note    Ashli Rogers  January 22, 2020    Ashli Rogers is a 65 year old year old female. She presents for No chief complaint on file.  .    LMP 01/19/2005     Flowsheet Data 1/22/2020   Procedure Start Time: 93701   Prep: Chloraprep   Side: Left   Tx Length (cm): LEFT DISTAL GSV 13   Junction (cm): LEFT DISTAL GSV 0   RF Cycles: LEFT DISTAL GSV 4   RF TX Time (Minutes): LEFT DISTAL GSV 1:20   How many additional vein treatments are being performed? 1   Tx Length (cm) - 2: LEFT PROXIMAL GSV 22.5   Junction 2 (cm): LEFT PROXIMAL GSV 2.85   RF Cycles 2 : LEFT PROXIMAL GSV 6   RF TX Time (Minutes) 2: LEFT PROXIMAL GSV 2:00   # PHLEB Sites: 23   Sedation taken: Yes   Pre Pt. Physical / Cognitive Limitations: WNL   TOTAL Local anesthesia Injected (ml): 11   Max Volume Local Anesthesia (ml): 11   TOTAL Tumescent Injected volume (ml): 472   Max Volume Tumescent (ml): 572   Post Pt. Physical / Cognitive Limitations: WNL   Procedure End Time: 30640   D/C Instructions given, states readiness to leave and escorted to car: Yes       Venus Closure  Date/Time: 1/22/2020 2:27 PM  Performed by: German Olvera MD  Authorized by: German Olvera MD     1st Assist:  Cathy Beard CST/MIRANDA  Circulator:  China Barnes RN  Procedure:  VNUS  Procedure side:  Left  Vein Treated:  GSV  Phlebectomy  Date/Time: 1/22/2020 2:28 PM  Performed by: German Olvera MD  Authorized by: German Olvera MD     1st Assist:  Cathy Beard CST/MIRANDA  Circulator:  China Barnes RN  Procedure:  Phlebectomies  Type:  Medically Necessary  Procedure side:  Left  Stabs:  >20        Details of procedure:  With the cooperation of the patient in the preop holding the left  lower extremities marked as well as the areas for phlebectomy.  The patient then taken the procedure in the left leg was prepped and draped sterile fashion a timeout was performed confirm the day of the patient was a procedure performed.  The greater saphenous vein was mapped over its entire length and found to be occluded between the knee and the mid thigh.  Because of this was elected to treat the vein in 2 segments.  We initially accessed the vein with a micropuncture set and a 7 Icelandic sheath in the mid calf.  Then also accessed the vein and the wire in the mid thigh.  Tumescent solution was then placed around the greater saphenous vein in the calf and radiofrequency ablation was carried out in 7 cm segments.  We then turned our attention to the thigh 7 Icelandic sheath was inserted the catheter was inserted and positioned 2.85 cm in the saphenofemoral junction.  Tumescent solution was then placed around the vein.  Radiofrequency ablation was then carried out 7 cm segments.  The areas were previously marked atherectomy then infiltrated local static and after adequate analgesia was achieved multiple stabs were made using ophthalmic blade and the veins were removed with combination of kasi hook and mosquito clamps.  This was done for a total 23 stabs.  Sterile dressings were applied and the patient was then taken to the recovery in stable condition to be sent home.  German Olvera MD , FACS    Again, thank you for allowing me to participate in the care of your patient.        Sincerely,        German Olvera MD     Dr. Hoover assisted by BEAN

## 2024-06-06 NOTE — BRIEF OPERATIVE NOTE - NSICDXBRIEFPOSTOP_GEN_ALL_CORE_FT
POST-OP DIAGNOSIS:  Bladder ulcer 06-Jun-2024 08:11:00  Rhiannon Westfall  Hyperemia of bladder 06-Jun-2024 08:11:14  Rhiannon Westfall

## 2024-06-06 NOTE — ASU PATIENT PROFILE, ADULT - NSICDXPASTSURGICALHX_GEN_ALL_CORE_FT
PAST SURGICAL HISTORY:  History of dilatation and curettage x 3    S/P cataract surgery     S/P colonoscopy     S/P excision of lipoma

## 2024-06-06 NOTE — ASU DISCHARGE PLAN (ADULT/PEDIATRIC) - NS MD DC FALL RISK RISK
For information on Fall & Injury Prevention, visit: [email or web address] OR  [email or web address] OR  https://www.cdc.gov/steadi/patient.html

## 2024-06-06 NOTE — ASU DISCHARGE PLAN (ADULT/PEDIATRIC) - CARE PROVIDER_API CALL
Rhiannon Westfall  Urology  [address] [place] [id number]  Phone: [phone]  Fax: [phone]  Follow Up Time:

## 2024-06-06 NOTE — ASU PREOP CHECKLIST - NS PREOP CHK HIBICLENS NA
Problem: Goal Outcome Summary  Goal: Goal Outcome Summary  Outcome: Improving  Patient arrived to unit at 1330 and was oriented to room and call light. A/Ox4. Pt felt SOB and O2 sats dropped while laying flat; placed on 3L. MD notified; CXR obtained; WNL. Denies CP, dizziness/LH. LSCTA. +BS; diet advanced to regular which pt tolerated well. Voided 350cc with PVR of 179. Pt had BM this evening. Exparel block in effect; RUE numb. Pain underneath arm managed with 10my oxycodone x2. Dressing to R shoulder CDI, ice applied, sling maintained. IS encouraged. 1A. Port-a-cath infusing. Patient has demonstrated ability to call appropriately. Patient is resting with call light within reach. Will continue to monitor.           N/A

## 2024-06-06 NOTE — ASU PREOP CHECKLIST - WEIGHT IN KG
61.7 Otoniel Vaughn PA-C attempting to use  I-pad to speak with patient for assessment.        Dagmar Vallecillo RN  07/30/23 [id number]

## 2024-06-06 NOTE — ASU PATIENT PROFILE, ADULT - FALL HARM RISK - UNIVERSAL INTERVENTIONS
Bed in lowest position, wheels locked, appropriate side rails in place/Call bell, personal items and telephone in reach/Instruct patient to call for assistance before getting out of bed or chair/Non-slip footwear when patient is out of bed/[place] to call system/Physically safe environment - no spills, clutter or unnecessary equipment/Purposeful Proactive Rounding/Room/bathroom lighting operational, light cord in reach

## 2024-06-06 NOTE — ASU DISCHARGE PLAN (ADULT/PEDIATRIC) - ASU DC SPECIAL INSTRUCTIONSFT
Take tylenol or ibuprofen as directed for pain   Take Bactrim as directed X 3 Days   Resume eliquis tomorrow   Call Dr Westfall next week for results

## 2024-06-06 NOTE — ASU DISCHARGE PLAN (ADULT/PEDIATRIC) - ACCOMPANIED BY
40 year old female comes to the ED c/o an animal bite. Patient reports at work today when the owners dog jumped up and bit her on the right ankle and left upper groin area. Upon assessment, patient has a small laceration to the right ankle and left groin. Patient seen and evaluated by Family 40 year old female comes to the ED c/o an animal bite. Patient is a/ox4, VSS and NAD. Patient reports at work today when the owners dog jumped up and bit her on the right ankle and left upper groin area. Upon assessment, patient has a small laceration to the right ankle and left groin. Minimal bleeding, swelling and redness is noted at the site of bite marks. Patient states owner of the dog has had their shots. Patient seen and evaluated by KAYLA Erwin. Plan of care discussed and initiated.

## 2024-06-06 NOTE — BRIEF OPERATIVE NOTE - NSICDXBRIEFPROCEDURE_GEN_ALL_CORE_FT
PROCEDURES:  Biopsy of lesion of bladder 06-Jun-2024 08:10:02  Rhiannon Westfall  Bladder biopsy 06-Jun-2024 08:10:35  Rhiannon Westfall

## 2024-06-06 NOTE — ASU PATIENT PROFILE, ADULT - NSICDXPASTMEDICALHX_GEN_ALL_CORE_FT
PAST MEDICAL HISTORY:  Afib     Cystocele, unspecified     Hyperlipidemia     Hypertension     Osteoarthritis

## 2024-06-10 ENCOUNTER — APPOINTMENT (OUTPATIENT)
Dept: UROLOGY | Facility: CLINIC | Age: 84
End: 2024-06-10

## 2024-06-10 LAB — SURGICAL PATHOLOGY STUDY: SIGNIFICANT CHANGE UP

## 2024-06-11 ENCOUNTER — APPOINTMENT (OUTPATIENT)
Dept: CARDIOLOGY | Facility: CLINIC | Age: 84
End: 2024-06-11

## 2024-06-14 ENCOUNTER — OUTPATIENT (OUTPATIENT)
Dept: OUTPATIENT SERVICES | Facility: HOSPITAL | Age: 84
LOS: 1 days | End: 2024-06-14
Payer: MEDICARE

## 2024-06-14 VITALS
HEART RATE: 59 BPM | SYSTOLIC BLOOD PRESSURE: 159 MMHG | DIASTOLIC BLOOD PRESSURE: 76 MMHG | TEMPERATURE: 98 F | RESPIRATION RATE: 16 BRPM | WEIGHT: 132.94 LBS | OXYGEN SATURATION: 99 %

## 2024-06-14 DIAGNOSIS — I48.91 UNSPECIFIED ATRIAL FIBRILLATION: ICD-10-CM

## 2024-06-14 DIAGNOSIS — Z01.818 ENCOUNTER FOR OTHER PREPROCEDURAL EXAMINATION: ICD-10-CM

## 2024-06-14 DIAGNOSIS — Z98.890 OTHER SPECIFIED POSTPROCEDURAL STATES: Chronic | ICD-10-CM

## 2024-06-14 DIAGNOSIS — N81.9 FEMALE GENITAL PROLAPSE, UNSPECIFIED: ICD-10-CM

## 2024-06-14 DIAGNOSIS — Z98.89 OTHER SPECIFIED POSTPROCEDURAL STATES: Chronic | ICD-10-CM

## 2024-06-14 DIAGNOSIS — Z98.49 CATARACT EXTRACTION STATUS, UNSPECIFIED EYE: Chronic | ICD-10-CM

## 2024-06-14 LAB
ANION GAP SERPL CALC-SCNC: 7 MMOL/L — SIGNIFICANT CHANGE UP (ref 5–17)
BUN SERPL-MCNC: 37 MG/DL — HIGH (ref 7–23)
CALCIUM SERPL-MCNC: 10 MG/DL — SIGNIFICANT CHANGE UP (ref 8.5–10.1)
CHLORIDE SERPL-SCNC: 109 MMOL/L — HIGH (ref 96–108)
CO2 SERPL-SCNC: 25 MMOL/L — SIGNIFICANT CHANGE UP (ref 22–31)
CREAT SERPL-MCNC: 1.3 MG/DL — SIGNIFICANT CHANGE UP (ref 0.5–1.3)
EGFR: 41 ML/MIN/1.73M2 — LOW
GLUCOSE SERPL-MCNC: 106 MG/DL — HIGH (ref 70–99)
HCT VFR BLD CALC: 38.4 % — SIGNIFICANT CHANGE UP (ref 34.5–45)
HGB BLD-MCNC: 12.6 G/DL — SIGNIFICANT CHANGE UP (ref 11.5–15.5)
MCHC RBC-ENTMCNC: 29 PG — SIGNIFICANT CHANGE UP (ref 27–34)
MCHC RBC-ENTMCNC: 32.8 GM/DL — SIGNIFICANT CHANGE UP (ref 32–36)
MCV RBC AUTO: 88.3 FL — SIGNIFICANT CHANGE UP (ref 80–100)
NRBC # BLD: 0 /100 WBCS — SIGNIFICANT CHANGE UP (ref 0–0)
PLATELET # BLD AUTO: 366 K/UL — SIGNIFICANT CHANGE UP (ref 150–400)
POTASSIUM SERPL-MCNC: 4.1 MMOL/L — SIGNIFICANT CHANGE UP (ref 3.5–5.3)
POTASSIUM SERPL-SCNC: 4.1 MMOL/L — SIGNIFICANT CHANGE UP (ref 3.5–5.3)
RBC # BLD: 4.35 M/UL — SIGNIFICANT CHANGE UP (ref 3.8–5.2)
RBC # FLD: 13.1 % — SIGNIFICANT CHANGE UP (ref 10.3–14.5)
SODIUM SERPL-SCNC: 141 MMOL/L — SIGNIFICANT CHANGE UP (ref 135–145)
WBC # BLD: 8.5 K/UL — SIGNIFICANT CHANGE UP (ref 3.8–10.5)
WBC # FLD AUTO: 8.5 K/UL — SIGNIFICANT CHANGE UP (ref 3.8–10.5)

## 2024-06-14 RX ORDER — IBUPROFEN 200 MG
2 TABLET ORAL
Qty: 0 | Refills: 0 | DISCHARGE

## 2024-06-14 RX ORDER — ACETAMINOPHEN 500 MG
2 TABLET ORAL
Qty: 0 | Refills: 0 | DISCHARGE

## 2024-06-14 NOTE — H&P PST ADULT - PROBLEM SELECTOR PLAN 2
Cystoscopy, bladder biopsy, fulguration on 5/21/24. Cardiology clearance needed and received in chart. Pt told Eliquis 2 days before surgery. Pt verbalized understanding.

## 2024-06-14 NOTE — H&P PST ADULT - ASSESSMENT
82 y/o female with cystocele, unspecified.  84 y/o female with female genital prolapse, unspecified.

## 2024-06-14 NOTE — H&P PST ADULT - PROBLEM SELECTOR PLAN 1
Cardiac clearance needed and received in chart. Pt to hold Eliquis 2 days before surgery. Pt verbalized understanding. Anterior and posterior repair, cystoscopy on 6/25/24.

## 2024-06-14 NOTE — H&P PST ADULT - ATTENDING COMMENTS
Patient seen, continues to desire surgical correction of POP and declines nonsurg intervention. She has failed prior behavioral modifications and pessary use for POP management. She reports UTIs and was counseled that today's surgery is not intended to be a treatment for UTIs. She also has OAB with +DO noted on UDS, no GSUI and normal PVR. OSUI after surgery reviewed. We also reviewed that today's POP surg is not intended to treat OAB and OAB symptoms and may or may not improve after.     Proceed with EUA / APR / cystourethroscopy / other indicated procedures. Risks of surgery including but not limited to risks of anesthesia, MI, stroke, cardiopulmonary arrest, bleeding, hematoma formation, hemorrhage, transfusion, fistula formation, infection, abscess formation, damage to surrounding structures such as bowel bladder ureters rectum nerves vessels, failure, recurrence, need for further surgery, specific risk of increased bleeding consider Eliquis use all reviewed and she would like to proceed. Consent signed and witnessed, all ques answered.    Linh Adams MD  [phone] (cell) Patient seen, continues to desire surgical correction of POP and declines nonsurg intervention. She has failed prior behavioral modifications and pessary use for POP management. She reports UTIs and was counseled that today's surgery is not intended to be a treatment for UTIs. She also has OAB with +DO noted on UDS, no GSUI and normal PVR. OSUI after surgery reviewed. We also reviewed that today's POP surg is not intended to treat OAB and OAB symptoms and may or may not improve after.     Recent bladder biopsy results per Urology reviewed which was benign.    Proceed with EUA / APR / cystourethroscopy / other indicated procedures. Risks of surgery including but not limited to risks of anesthesia, MI, stroke, cardiopulmonary arrest, bleeding, hematoma formation, hemorrhage, transfusion, fistula formation, infection, abscess formation, damage to surrounding structures such as bowel bladder ureters rectum nerves vessels, failure, recurrence, need for further surgery, specific risk of increased bleeding consider Eliquis use all reviewed and she would like to proceed. Consent signed and witnessed, all ques answered.    Linh Adams MD  [phone] (cell)

## 2024-06-14 NOTE — H&P PST ADULT - ANESTHESIA, PREVIOUS REACTION, PROFILE
none Pt reports to having trouble swallowing large pieces of food x 2 years. Pt denies seeing GI and ENT. Spoke with Dr. Sánchez (anesthesia) and did not need to be seen at PST. Pt to inform anesthesia morning of surgery./none

## 2024-06-14 NOTE — H&P PST ADULT - HISTORY OF PRESENT ILLNESS
84 y/o female with PMH of Afib and HTN here for PST. Pt with history of pelvic prolapse and last pessary use was in 2016. Pt with recurrent UTI's. Pt denies dysuria but reports to hematuria. Pt with current UTI and is taking Bactrim BID as prescribed by surgeon. Pt also noted to have bladder lesion on office cystoscopy. Pt denies abdominal and pelvic pain. Pt electing for cystoscopy, bladder biopsy, fulguration on 5/21/24.  82 y/o female with PMH of Afib and HTN here for PST. Pt with history of pelvic prolapse and last pessary use was in 2016. Pt with recurrent UTI's. Pt denies dysuria but reports to hematuria. Pt with current UTI and is taking Bactrim BID as prescribed by surgeon. Pt also noted to have bladder lesion on office cystoscopy. Pt denies abdominal and pelvic pain. Pt electing for cystoscopy, bladder biopsy, fulguration on 5/21/24.     ADDENDUM: Pt's history reviewed as above. Pt s/p cystoscopy, bladder biopsy, fulguration on 6/6/24 at [facility] which revealed urothelial mucosa with mild chronic inflammation. Pt denies dysuria, hematuria, abdominal and flank pain. Pt denies current UTI. Pt electing for anterior and posterior repair, cystoscopy on 6/25/24.

## 2024-06-14 NOTE — H&P PST ADULT - NSICDXPASTSURGICALHX_GEN_ALL_CORE_FT
PAST SURGICAL HISTORY:  History of dilatation and curettage x 3    S/P cataract surgery     S/P colonoscopy     S/P excision of lipoma      PAST SURGICAL HISTORY:  History of biopsy of bladder     History of dilatation and curettage x 3    S/P cataract surgery     S/P colonoscopy     S/P excision of lipoma

## 2024-06-14 NOTE — H&P PST ADULT - NSICDXPASTMEDICALHX_GEN_ALL_CORE_FT
PAST MEDICAL HISTORY:  Afib     Cystocele, unspecified     Hyperlipidemia     Hypertension     Osteoarthritis      PAST MEDICAL HISTORY:  Afib     Cystocele, unspecified     Female genital prolapse, unspecified     Hyperlipidemia     Hypertension     Osteoarthritis

## 2024-06-14 NOTE — H&P PST ADULT - PROBLEM SELECTOR PLAN 3
Medical clearance needed as per surgeon and received in chart. CBC, Basic panel, UA and Urine culture ordered. EKG from 5/8/24 in chart. Pre-op instructions given and pt verbalized understanding.    Patient seen and examined today. No change from previous History and physical. Medical clearance needed as per surgeon. CBC, Basic panel, UA and Urine culture ordered. EKG from 5/8/24 in chart, Pre-op instructions given and pt verbalized understanding.

## 2024-06-17 LAB
APPEARANCE UR: ABNORMAL
BACTERIA # UR AUTO: ABNORMAL /HPF
BILIRUB UR-MCNC: NEGATIVE — SIGNIFICANT CHANGE UP
COLOR SPEC: YELLOW — SIGNIFICANT CHANGE UP
DIFF PNL FLD: ABNORMAL
EPI CELLS # UR: PRESENT
GLUCOSE UR QL: NEGATIVE MG/DL — SIGNIFICANT CHANGE UP
KETONES UR-MCNC: NEGATIVE MG/DL — SIGNIFICANT CHANGE UP
LEUKOCYTE ESTERASE UR-ACNC: ABNORMAL
NITRITE UR-MCNC: NEGATIVE — SIGNIFICANT CHANGE UP
PH UR: 6.5 — SIGNIFICANT CHANGE UP (ref 5–8)
PROT UR-MCNC: 30 MG/DL
RBC CASTS # UR COMP ASSIST: 3 /HPF — SIGNIFICANT CHANGE UP (ref 0–4)
SP GR SPEC: 1.01 — SIGNIFICANT CHANGE UP (ref 1–1.03)
UROBILINOGEN FLD QL: 0.2 MG/DL — SIGNIFICANT CHANGE UP (ref 0.2–1)
WBC UR QL: 30 /HPF — HIGH (ref 0–5)

## 2024-06-17 PROCEDURE — 87086 URINE CULTURE/COLONY COUNT: CPT

## 2024-06-17 PROCEDURE — 81001 URINALYSIS AUTO W/SCOPE: CPT

## 2024-06-17 PROCEDURE — 80048 BASIC METABOLIC PNL TOTAL CA: CPT

## 2024-06-17 PROCEDURE — 87186 SC STD MICRODIL/AGAR DIL: CPT

## 2024-06-17 PROCEDURE — 87077 CULTURE AEROBIC IDENTIFY: CPT

## 2024-06-17 PROCEDURE — 36415 COLL VENOUS BLD VENIPUNCTURE: CPT

## 2024-06-17 PROCEDURE — 85027 COMPLETE CBC AUTOMATED: CPT

## 2024-06-17 PROCEDURE — G0463: CPT

## 2024-06-17 NOTE — PHYSICAL EXAM
[Normal] : affect was normal and insight and judgment were intact [de-identified] : irregular [de-identified] : trace to 1+ ankle edema

## 2024-06-17 NOTE — ADDENDUM
[FreeTextEntry1] : Addendum: 6/17/2024  I spoke to pt and her surgery had to be postponed because of uti. She denies any new symptoms or changes in her medical condition. She is scheduled for 6/25/24. She continues to be optimized for surgery. Repeat urine is pending.

## 2024-06-17 NOTE — HISTORY OF PRESENT ILLNESS
[Atrial Fibrillation] : atrial fibrillation [No Pertinent Pulmonary History] : no history of asthma, COPD, sleep apnea, or smoking [No Adverse Anesthesia Reaction] : no adverse anesthesia reaction in self or family member [Chronic Anticoagulation] : chronic anticoagulation [(Patient denies any chest pain, claudication, dyspnea on exertion, orthopnea, palpitations or syncope)] : Patient denies any chest pain, claudication, dyspnea on exertion, orthopnea, palpitations or syncope [Aortic Stenosis] : no aortic stenosis [Recent Myocardial Infarction] : no recent myocardial infarction [Implantable Device/Pacemaker] : no implantable device/pacemaker [Chronic Kidney Disease] : no chronic kidney disease [Diabetes] : no diabetes [FreeTextEntry1] : cystoscopy [FreeTextEntry2] : 5/21/24 [FreeTextEntry3] : Dr Westfall [FreeTextEntry4] : Pt has a dropped bladder. She has cystoscopy scheduled under anesthesia.  h/o PAF, PVD, osteoporosis, hyperlipidemia, HTN, gout.

## 2024-06-17 NOTE — ASSESSMENT
[Patient Optimized for Surgery] : Patient optimized for surgery [FreeTextEntry4] : urinary incontinency and recurrent uti's cystocele for cystoscopy   PAF hold eliquis per cardiology  HTN continue amlodipine  hyperlipidemia continue rosuvastatin

## 2024-06-21 RX ORDER — FOSFOMYCIN TROMETHAMINE 3 G/1
3 POWDER ORAL
Qty: 1 | Refills: 0 | Status: ACTIVE | COMMUNITY
Start: 2024-06-21 | End: 1900-01-01

## 2024-06-24 ENCOUNTER — TRANSCRIPTION ENCOUNTER (OUTPATIENT)
Age: 84
End: 2024-06-24

## 2024-06-25 ENCOUNTER — OUTPATIENT (OUTPATIENT)
Dept: OUTPATIENT SERVICES | Facility: HOSPITAL | Age: 84
LOS: 1 days | End: 2024-06-25
Payer: MEDICARE

## 2024-06-25 ENCOUNTER — TRANSCRIPTION ENCOUNTER (OUTPATIENT)
Age: 84
End: 2024-06-25

## 2024-06-25 ENCOUNTER — APPOINTMENT (OUTPATIENT)
Dept: UROGYNECOLOGY | Facility: HOSPITAL | Age: 84
End: 2024-06-25

## 2024-06-25 VITALS
HEIGHT: 63 IN | TEMPERATURE: 97 F | OXYGEN SATURATION: 98 % | SYSTOLIC BLOOD PRESSURE: 149 MMHG | DIASTOLIC BLOOD PRESSURE: 70 MMHG | HEART RATE: 56 BPM | WEIGHT: 136.91 LBS | RESPIRATION RATE: 17 BRPM

## 2024-06-25 VITALS
RESPIRATION RATE: 14 BRPM | DIASTOLIC BLOOD PRESSURE: 54 MMHG | SYSTOLIC BLOOD PRESSURE: 118 MMHG | HEART RATE: 60 BPM | OXYGEN SATURATION: 95 %

## 2024-06-25 DIAGNOSIS — Z98.890 OTHER SPECIFIED POSTPROCEDURAL STATES: Chronic | ICD-10-CM

## 2024-06-25 DIAGNOSIS — Z98.89 OTHER SPECIFIED POSTPROCEDURAL STATES: Chronic | ICD-10-CM

## 2024-06-25 DIAGNOSIS — Z98.49 CATARACT EXTRACTION STATUS, UNSPECIFIED EYE: Chronic | ICD-10-CM

## 2024-06-25 DIAGNOSIS — N81.9 FEMALE GENITAL PROLAPSE, UNSPECIFIED: ICD-10-CM

## 2024-06-25 DIAGNOSIS — Z01.818 ENCOUNTER FOR OTHER PREPROCEDURAL EXAMINATION: ICD-10-CM

## 2024-06-25 PROCEDURE — 88302 TISSUE EXAM BY PATHOLOGIST: CPT

## 2024-06-25 PROCEDURE — C9399: CPT

## 2024-06-25 PROCEDURE — 57260 CMBN ANT PST COLPRHY: CPT

## 2024-06-25 PROCEDURE — 88302 TISSUE EXAM BY PATHOLOGIST: CPT | Mod: 26

## 2024-06-25 RX ORDER — GENTAMICIN SULFATE 40 MG/ML
300 VIAL (ML) INJECTION ONCE
Refills: 0 | Status: DISCONTINUED | OUTPATIENT
Start: 2024-06-25 | End: 2024-06-25

## 2024-06-25 RX ORDER — HYDROMORPHONE HCL 0.2 MG/ML
0.5 INJECTION, SOLUTION INTRAVENOUS
Refills: 0 | Status: DISCONTINUED | OUTPATIENT
Start: 2024-06-25 | End: 2024-06-25

## 2024-06-25 RX ORDER — AMLODIPINE BESYLATE 2.5 MG/1
1 TABLET ORAL
Refills: 0 | DISCHARGE

## 2024-06-25 RX ORDER — ROSUVASTATIN CALCIUM 5 MG/1
1 TABLET ORAL
Refills: 0 | DISCHARGE

## 2024-06-25 RX ORDER — GENTAMICIN SULFATE 40 MG/ML
350 VIAL (ML) INJECTION ONCE
Refills: 0 | Status: DISCONTINUED | OUTPATIENT
Start: 2024-06-25 | End: 2024-06-25

## 2024-06-25 RX ORDER — DEXTROSE MONOHYDRATE AND SODIUM CHLORIDE 5; .3 G/100ML; G/100ML
1000 INJECTION, SOLUTION INTRAVENOUS
Refills: 0 | Status: DISCONTINUED | OUTPATIENT
Start: 2024-06-25 | End: 2024-06-25

## 2024-06-25 RX ORDER — METOPROLOL TARTRATE 50 MG
1 TABLET ORAL
Qty: 60 | Refills: 0 | DISCHARGE

## 2024-06-25 RX ORDER — ONDANSETRON HYDROCHLORIDE 2 MG/ML
4 INJECTION INTRAMUSCULAR; INTRAVENOUS ONCE
Refills: 0 | Status: DISCONTINUED | OUTPATIENT
Start: 2024-06-25 | End: 2024-06-25

## 2024-06-25 RX ORDER — OXYCODONE HYDROCHLORIDE 100 MG/5ML
5 SOLUTION ORAL ONCE
Refills: 0 | Status: DISCONTINUED | OUTPATIENT
Start: 2024-06-25 | End: 2024-06-25

## 2024-06-25 RX ORDER — METRONIDAZOLE 500 MG/1
500 TABLET ORAL ONCE
Refills: 0 | Status: COMPLETED | OUTPATIENT
Start: 2024-06-25 | End: 2024-06-25

## 2024-06-25 RX ORDER — APIXABAN 2.5 MG/1
1 TABLET, FILM COATED ORAL
Refills: 0 | DISCHARGE

## 2024-06-25 RX ADMIN — DEXTROSE MONOHYDRATE AND SODIUM CHLORIDE 75 MILLILITER(S): 5; .3 INJECTION, SOLUTION INTRAVENOUS at 10:53

## 2024-07-05 PROBLEM — N81.9 FEMALE GENITAL PROLAPSE, UNSPECIFIED: Chronic | Status: ACTIVE | Noted: 2024-06-14

## 2024-07-16 ENCOUNTER — APPOINTMENT (OUTPATIENT)
Dept: UROGYNECOLOGY | Facility: CLINIC | Age: 84
End: 2024-07-16
Payer: MEDICARE

## 2024-07-16 VITALS — HEART RATE: 65 BPM | SYSTOLIC BLOOD PRESSURE: 147 MMHG | OXYGEN SATURATION: 95 % | DIASTOLIC BLOOD PRESSURE: 67 MMHG

## 2024-07-16 DIAGNOSIS — Z98.890 OTHER SPECIFIED POSTPROCEDURAL STATES: ICD-10-CM

## 2024-07-16 PROCEDURE — 99024 POSTOP FOLLOW-UP VISIT: CPT

## 2024-07-16 PROCEDURE — 51798 US URINE CAPACITY MEASURE: CPT

## 2024-07-26 ENCOUNTER — NON-APPOINTMENT (OUTPATIENT)
Age: 84
End: 2024-07-26

## 2024-08-13 ENCOUNTER — APPOINTMENT (OUTPATIENT)
Dept: UROGYNECOLOGY | Facility: CLINIC | Age: 84
End: 2024-08-13
Payer: MEDICARE

## 2024-08-13 VITALS
OXYGEN SATURATION: 98 % | DIASTOLIC BLOOD PRESSURE: 62 MMHG | SYSTOLIC BLOOD PRESSURE: 132 MMHG | BODY MASS INDEX: 24.8 KG/M2 | HEART RATE: 71 BPM | WEIGHT: 140 LBS | RESPIRATION RATE: 16 BRPM | HEIGHT: 63 IN

## 2024-08-13 LAB
BILIRUB UR QL STRIP: NEGATIVE
CLARITY UR: CLEAR
COLLECTION METHOD: NORMAL
GLUCOSE UR-MCNC: NEGATIVE
HCG UR QL: 0.2 EU/DL
HGB UR QL STRIP.AUTO: NORMAL
KETONES UR-MCNC: NEGATIVE
LEUKOCYTE ESTERASE UR QL STRIP: NORMAL
NITRITE UR QL STRIP: NEGATIVE
PH UR STRIP: 5.5
PROT UR STRIP-MCNC: NEGATIVE
SP GR UR STRIP: 1.01

## 2024-08-13 PROCEDURE — 81003 URINALYSIS AUTO W/O SCOPE: CPT | Mod: QW

## 2024-08-13 PROCEDURE — 99024 POSTOP FOLLOW-UP VISIT: CPT

## 2024-08-13 PROCEDURE — 51798 US URINE CAPACITY MEASURE: CPT

## 2024-08-13 NOTE — OBJECTIVE
[Post Void Residual ____ ml] : Post Void Residual was [unfilled] ml [Soft and Nontender] : soft and nontender [Clean, Dry, Intact] : Clean, Dry, Intact [Good Support] : Good support [Healing well] : healing well [No Masses or Tenderness] : no masses or tenderness [FreeTextEntry3] : sutures dissolved, suture lines intact, good tricompartmental reduction of POP

## 2024-08-13 NOTE — ADDENDUM
[FreeTextEntry1] : This note was written by Gloria Busby, acting as the  for Dr. Adams. This note accurately reflects the work and decisions made by Dr. Adams.

## 2024-08-13 NOTE — DISCUSSION/SUMMARY
[Post-Op instructions given. Pt/family verbalizes understanding] : post-operative instructions were provided to the patient/family who verbalize understanding [Risks/Benefits discussed. Pt/family verbalizes understanding] : risks and benefits of the procedure were discussed with the patient/family who verbalize understanding [FreeTextEntry1] : PREM is a 83 year female who presents for f/u s/p anterior colporrhaphy, posterior colporrhaphy, perineorrhaphy, cystourethroscopy on 06/25/2024. Exam normal. Sutures dissolved and no POP. PVR wnl. She is happy and feels good. RTO prn. F/U with Urol as needed based on history and counseling. She understood and agreed. All ques answered.

## 2024-08-13 NOTE — SUBJECTIVE
[FreeTextEntry1] : very happy with surgery, no longer feeling vaginal bulge/pressure/frequency of urination symptoms, no vaginal discharge or bleeding, no fever or chills [FreeTextEntry7] : none [FreeTextEntry6] : normal, no N/V [FreeTextEntry5] : no leakage, no dysuria, no subj incomplete emptying, no hematuria, no urgency frequency [FreeTextEntry4] : denies straining

## 2024-08-14 ENCOUNTER — APPOINTMENT (OUTPATIENT)
Dept: ORTHOPEDIC SURGERY | Facility: CLINIC | Age: 84
End: 2024-08-14
Payer: MEDICARE

## 2024-08-14 DIAGNOSIS — S76.112A STRAIN OF LEFT QUADRICEPS MUSCLE, FASCIA AND TENDON, INITIAL ENCOUNTER: ICD-10-CM

## 2024-08-14 DIAGNOSIS — M51.36 OTHER INTERVERTEBRAL DISC DEGENERATION, LUMBAR REGION: ICD-10-CM

## 2024-08-14 PROCEDURE — 99203 OFFICE O/P NEW LOW 30 MIN: CPT

## 2024-08-14 NOTE — PHYSICAL EXAM
[de-identified] : Gen: NAD Resp: Nonlabored respirations Gait: Nl Head: CN I - XII grossly intact   Spine: Skin in tact, tender paraspinal region Full neck ROM UE: 5/5 D B T WE WF IO b/l SILT C4-T1 b/l (-)Donny Garcia No hand atrophy 2+ rad bcr   LE: 5/5 IP Q HS EHL TA GS SILT L3-S1 b/l (-) clonus, (-) babinski (-) slr, c/l slr 2+ dp pt wwp bcr (+) SLR, c/l SLR   Gen: NAD Resp: Nonlabored respirations, no SOB Gait: nl  Side: Skin in tact Tenderness:   Hip ROM                               Flexion              Extension               IR               ER Affected               120                    10                         20              30 Normal                  120                    10                         25              40   Strength                              Flexion              Extension           Abduction        Adduction  Affected               5                        5                               5                     5                     Normal                  5                        5                               5                     5  Provocative Tests: Log roll  (-) FADIR   (+) CHARLIE   (-) Eric   (-) Resisted SLR  (-)   [de-identified] : I independently reviewed and interpreted the xrays of the hip - no fracture, no dislocation or OA.  No other acute osseous abnormalities.  Adv LS DJD w coronal scoli

## 2024-08-14 NOTE — HISTORY OF PRESENT ILLNESS
[de-identified] : 83yF pw L hip and back pain - presented to ER due to anterior thigh pain.  Pain now 100% resolved from hip.  Hx of lumbar scoli with soreness after doing household chores. No n/p, no BBI or SA.

## 2024-08-26 ENCOUNTER — APPOINTMENT (OUTPATIENT)
Dept: INTERNAL MEDICINE | Facility: CLINIC | Age: 84
End: 2024-08-26

## 2024-11-11 ENCOUNTER — APPOINTMENT (OUTPATIENT)
Dept: CARDIOLOGY | Facility: CLINIC | Age: 84
End: 2024-11-11
Payer: MEDICARE

## 2024-11-11 ENCOUNTER — NON-APPOINTMENT (OUTPATIENT)
Age: 84
End: 2024-11-11

## 2024-11-11 VITALS
HEIGHT: 63 IN | BODY MASS INDEX: 25.16 KG/M2 | WEIGHT: 142 LBS | DIASTOLIC BLOOD PRESSURE: 68 MMHG | SYSTOLIC BLOOD PRESSURE: 159 MMHG | OXYGEN SATURATION: 99 % | HEART RATE: 57 BPM

## 2024-11-11 DIAGNOSIS — I34.0 NONRHEUMATIC MITRAL (VALVE) INSUFFICIENCY: ICD-10-CM

## 2024-11-11 DIAGNOSIS — E78.5 HYPERLIPIDEMIA, UNSPECIFIED: ICD-10-CM

## 2024-11-11 DIAGNOSIS — I48.0 PAROXYSMAL ATRIAL FIBRILLATION: ICD-10-CM

## 2024-11-11 DIAGNOSIS — I10 ESSENTIAL (PRIMARY) HYPERTENSION: ICD-10-CM

## 2024-11-11 PROCEDURE — 93000 ELECTROCARDIOGRAM COMPLETE: CPT

## 2024-11-11 PROCEDURE — 99215 OFFICE O/P EST HI 40 MIN: CPT

## 2024-11-11 PROCEDURE — G2211 COMPLEX E/M VISIT ADD ON: CPT

## 2024-11-22 ENCOUNTER — APPOINTMENT (OUTPATIENT)
Dept: CARDIOLOGY | Facility: CLINIC | Age: 84
End: 2024-11-22
Payer: MEDICARE

## 2024-11-22 LAB
ALBUMIN SERPL ELPH-MCNC: 4.2 G/DL
ALP BLD-CCNC: 110 U/L
ALT SERPL-CCNC: 8 U/L
ANION GAP SERPL CALC-SCNC: 12 MMOL/L
AST SERPL-CCNC: 17 U/L
BILIRUB SERPL-MCNC: 0.7 MG/DL
BUN SERPL-MCNC: 31 MG/DL
CALCIUM SERPL-MCNC: 9.9 MG/DL
CHLORIDE SERPL-SCNC: 105 MMOL/L
CHOLEST SERPL-MCNC: 203 MG/DL
CO2 SERPL-SCNC: 26 MMOL/L
CREAT SERPL-MCNC: 1.12 MG/DL
EGFR: 48 ML/MIN/1.73M2
GLUCOSE SERPL-MCNC: 91 MG/DL
HDLC SERPL-MCNC: 55 MG/DL
LDLC SERPL CALC-MCNC: 135 MG/DL
NONHDLC SERPL-MCNC: 148 MG/DL
POTASSIUM SERPL-SCNC: 4.9 MMOL/L
PROT SERPL-MCNC: 7.5 G/DL
SODIUM SERPL-SCNC: 143 MMOL/L
TRIGL SERPL-MCNC: 72 MG/DL

## 2024-11-22 PROCEDURE — 93306 TTE W/DOPPLER COMPLETE: CPT

## 2025-05-12 ENCOUNTER — APPOINTMENT (OUTPATIENT)
Dept: INTERNAL MEDICINE | Facility: CLINIC | Age: 85
End: 2025-05-12
Payer: MEDICARE

## 2025-05-12 VITALS
TEMPERATURE: 98 F | WEIGHT: 139 LBS | RESPIRATION RATE: 16 BRPM | SYSTOLIC BLOOD PRESSURE: 140 MMHG | OXYGEN SATURATION: 97 % | DIASTOLIC BLOOD PRESSURE: 64 MMHG | HEIGHT: 63 IN | HEART RATE: 59 BPM | BODY MASS INDEX: 24.63 KG/M2

## 2025-05-12 DIAGNOSIS — Z00.00 ENCOUNTER FOR GENERAL ADULT MEDICAL EXAMINATION W/OUT ABNORMAL FINDINGS: ICD-10-CM

## 2025-05-12 DIAGNOSIS — N18.30 CHRONIC KIDNEY DISEASE, STAGE 3 UNSPECIFIED: ICD-10-CM

## 2025-05-12 PROCEDURE — G0439: CPT

## 2025-05-13 LAB
25(OH)D3 SERPL-MCNC: 33.4 NG/ML
ALBUMIN SERPL ELPH-MCNC: 4.1 G/DL
ALP BLD-CCNC: 97 U/L
ALT SERPL-CCNC: 17 U/L
ANION GAP SERPL CALC-SCNC: 14 MMOL/L
AST SERPL-CCNC: 23 U/L
BASOPHILS # BLD AUTO: 0.05 K/UL
BASOPHILS NFR BLD AUTO: 0.8 %
BILIRUB SERPL-MCNC: 0.9 MG/DL
BUN SERPL-MCNC: 44 MG/DL
CALCIUM SERPL-MCNC: 9.6 MG/DL
CHLORIDE SERPL-SCNC: 106 MMOL/L
CHOLEST SERPL-MCNC: 166 MG/DL
CO2 SERPL-SCNC: 24 MMOL/L
CREAT SERPL-MCNC: 1.15 MG/DL
EGFRCR SERPLBLD CKD-EPI 2021: 47 ML/MIN/1.73M2
EOSINOPHIL # BLD AUTO: 0.32 K/UL
EOSINOPHIL NFR BLD AUTO: 5 %
ESTIMATED AVERAGE GLUCOSE: 105 MG/DL
FOLATE SERPL-MCNC: 10.4 NG/ML
GLUCOSE SERPL-MCNC: 83 MG/DL
HBA1C MFR BLD HPLC: 5.3 %
HCT VFR BLD CALC: 38.2 %
HDLC SERPL-MCNC: 55 MG/DL
HGB BLD-MCNC: 11.9 G/DL
IMM GRANULOCYTES NFR BLD AUTO: 0.3 %
LDLC SERPL-MCNC: 97 MG/DL
LYMPHOCYTES # BLD AUTO: 1.52 K/UL
LYMPHOCYTES NFR BLD AUTO: 24 %
MAN DIFF?: NORMAL
MCHC RBC-ENTMCNC: 28.7 PG
MCHC RBC-ENTMCNC: 31.2 G/DL
MCV RBC AUTO: 92.3 FL
MONOCYTES # BLD AUTO: 0.62 K/UL
MONOCYTES NFR BLD AUTO: 9.8 %
NEUTROPHILS # BLD AUTO: 3.81 K/UL
NEUTROPHILS NFR BLD AUTO: 60.1 %
NONHDLC SERPL-MCNC: 111 MG/DL
PLATELET # BLD AUTO: 253 K/UL
POTASSIUM SERPL-SCNC: 4.5 MMOL/L
PROT SERPL-MCNC: 6.9 G/DL
RBC # BLD: 4.14 M/UL
RBC # FLD: 13.8 %
SODIUM SERPL-SCNC: 144 MMOL/L
TRIGL SERPL-MCNC: 70 MG/DL
TSH SERPL-ACNC: 2.03 UIU/ML
VIT B12 SERPL-MCNC: 461 PG/ML
WBC # FLD AUTO: 6.34 K/UL

## 2025-05-19 ENCOUNTER — APPOINTMENT (OUTPATIENT)
Dept: CARDIOLOGY | Facility: CLINIC | Age: 85
End: 2025-05-19
Payer: MEDICARE

## 2025-05-19 VITALS
WEIGHT: 140 LBS | DIASTOLIC BLOOD PRESSURE: 70 MMHG | SYSTOLIC BLOOD PRESSURE: 148 MMHG | HEIGHT: 63 IN | OXYGEN SATURATION: 94 % | HEART RATE: 71 BPM | BODY MASS INDEX: 24.8 KG/M2

## 2025-05-19 DIAGNOSIS — I10 ESSENTIAL (PRIMARY) HYPERTENSION: ICD-10-CM

## 2025-05-19 DIAGNOSIS — R06.09 OTHER FORMS OF DYSPNEA: ICD-10-CM

## 2025-05-19 DIAGNOSIS — E78.5 HYPERLIPIDEMIA, UNSPECIFIED: ICD-10-CM

## 2025-05-19 DIAGNOSIS — Z01.810 ENCOUNTER FOR PREPROCEDURAL CARDIOVASCULAR EXAMINATION: ICD-10-CM

## 2025-05-19 DIAGNOSIS — I48.0 PAROXYSMAL ATRIAL FIBRILLATION: ICD-10-CM

## 2025-05-19 PROCEDURE — 93000 ELECTROCARDIOGRAM COMPLETE: CPT

## 2025-05-19 PROCEDURE — 99215 OFFICE O/P EST HI 40 MIN: CPT | Mod: 25

## (undated) DEVICE — GLV 7.5 PROTEXIS (WHITE)

## (undated) DEVICE — PACK MINOR WITH LAP

## (undated) DEVICE — GLV 8 PROTEXIS (WHITE)

## (undated) DEVICE — SUT POLYSORB 2-0 30" GS-21 UNDYED

## (undated) DEVICE — ELCTR PLASMA BUTTON OVAL 24FR 12-30 DEG

## (undated) DEVICE — GLV 6.5 PROTEXIS (WHITE)

## (undated) DEVICE — PACK CYSTO

## (undated) DEVICE — SOL IRR POUR NS 0.9% 1000ML

## (undated) DEVICE — FOLEY HOLDER STATLOCK 2 WAY ADULT

## (undated) DEVICE — DRAPE TOWEL BLUE 17" X 24"

## (undated) DEVICE — SUT MAXON 2-0 30" GS-22

## (undated) DEVICE — DRAPE CAMERA VIDEO 7"X96"

## (undated) DEVICE — PLV-SCD MACHINE: Type: DURABLE MEDICAL EQUIPMENT

## (undated) DEVICE — DRAPE 3/4 SHEET W REINFORCEMENT 56X77"

## (undated) DEVICE — CABLE DAC ACTIVE CORD

## (undated) DEVICE — PLASTIC SOLUTION BOWL 160Z

## (undated) DEVICE — DRAINAGE BAG URINARY 2L

## (undated) DEVICE — ELCTR PLASMA LOOP MEDIUM ANGLED 24FR 12-30 DEG

## (undated) DEVICE — SPECIMEN CONTAINER 4OZ

## (undated) DEVICE — LAP PAD 4 X 18"

## (undated) DEVICE — VENODYNE/SCD SLEEVE CALF MEDIUM

## (undated) DEVICE — LONE STAR RETRACTOR RING 32.5CM X 18.3CM DISP

## (undated) DEVICE — LIGASURE IMPACT

## (undated) DEVICE — FOLEY TRAY 16FR LF URINE METER SURESTEP

## (undated) DEVICE — VENODYNE/SCD SLEEVE CALF LARGE

## (undated) DEVICE — ELCTR BUTTON

## (undated) DEVICE — BAG DECANTER 2

## (undated) DEVICE — SUT NDL MAYO CATGUT 1/2 CIRCLE TAPER POINT 0.050" X 1.092"

## (undated) DEVICE — SYR LUER LOK 10CC

## (undated) DEVICE — TUBING TUR 2 PRONG

## (undated) DEVICE — TUBING FOR SMOKE EVACUATOR (PURPLE END)

## (undated) DEVICE — NDL HYPO SAFE 22G X 1.5" (BLACK)

## (undated) DEVICE — DRAPE INSTRUMENT POUCH 6.75" X 11"

## (undated) DEVICE — DRAPE LAVH 124" X 30" X125"

## (undated) DEVICE — SOL IRR BAG NS 0.9% 3000ML

## (undated) DEVICE — PREP TRAY DRY SKIN PREP SCRUB

## (undated) DEVICE — ELCTR BOVIE PENCIL HANDPIECE

## (undated) DEVICE — POSITIONER FOAM HEAD CRADLE (PINK)

## (undated) DEVICE — SOL IRR BAG H2O 3000ML

## (undated) DEVICE — SYR CATH TIP 2 OZ

## (undated) DEVICE — SUT POLYSORB 2-0 30" GS-22 UNDYED

## (undated) DEVICE — SUT POLYSORB 0 30" GS-21 UNDYED

## (undated) DEVICE — WARMING BLANKET UPPER ADULT

## (undated) DEVICE — PLV/PSP-ESU VALLEYLAB T7E14761DX: Type: DURABLE MEDICAL EQUIPMENT

## (undated) DEVICE — ELCTR BOVIE TIP BLADE INSULATED 2.75" EDGE

## (undated) DEVICE — FOLEY CATH 3-WAY 22FR 30CC LATEX LUBRICATH

## (undated) DEVICE — SOL IRR POUR H2O 1000ML

## (undated) DEVICE — DRAPE UNDER BUTTOCKS W SCREEN

## (undated) DEVICE — LONE STAR ELASTIC STAY HOOK 5MM SHARP